# Patient Record
Sex: FEMALE | Race: BLACK OR AFRICAN AMERICAN | Employment: UNEMPLOYED | URBAN - METROPOLITAN AREA
[De-identification: names, ages, dates, MRNs, and addresses within clinical notes are randomized per-mention and may not be internally consistent; named-entity substitution may affect disease eponyms.]

---
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
## 2020-03-17 ENCOUNTER — TELEPHONE (OUTPATIENT)
Dept: FAMILY MEDICINE | Facility: CLINIC | Age: 20
End: 2020-03-17

## 2020-03-17 NOTE — TELEPHONE ENCOUNTER
Called patient.  She stated that she has had UTI's in the past and thinks that she has another one.  She is having urinary urgency, frequency, and burning.  She also stated that she has some med lower abdominal pain at night 4/10.  She denies any fevers, chills, flank  Pain, blood in her urine, vaginal discharge or odor.  Symptoms have been for one week.  Routed to provider to please advise.  Claire Stubbs RN,BSN  [facility]

## 2020-03-17 NOTE — TELEPHONE ENCOUNTER
Attempt # 1  Called patient at home number. [phone]  Was call answered?  No answer, left message to call nurse line at [phone]          Bethany Bee RN  [facility]

## 2020-03-17 NOTE — TELEPHONE ENCOUNTER
Reason for Call:  Other     Detailed comments: Patient has symptoms of a UTI and would like to discuss if an appointment needs to be scheduled due to she is unclear if this is a UTI.     Phone Number Patient can be reached at: Cell number on file:    Telephone Information:   Mobile [phone]       Best Time:     Can we leave a detailed message on this number? YES    Call taken on 3/17/2020 at 9:25 AM by Yolis Sutton

## 2020-03-17 NOTE — TELEPHONE ENCOUNTER
Appointment made.    Patient stated understanding and agreeable with the plan of care. Claire Stubbs,RN,BSN, [facility] Triage.

## 2020-03-18 ENCOUNTER — VIRTUAL VISIT (OUTPATIENT)
Dept: FAMILY MEDICINE | Facility: CLINIC | Age: 20
End: 2020-03-18
Payer: COMMERCIAL

## 2020-03-18 DIAGNOSIS — N39.0 URINARY TRACT INFECTION WITHOUT HEMATURIA, SITE UNSPECIFIED: Primary | ICD-10-CM

## 2020-03-18 PROCEDURE — 98966 PH1 ASSMT&MGMT NQHP 5-10: CPT | Performed by: PHYSICIAN ASSISTANT

## 2020-03-18 RX ORDER — SULFAMETHOXAZOLE/TRIMETHOPRIM 800-160 MG
1 TABLET ORAL 2 TIMES DAILY
Qty: 6 TABLET | Refills: 0 | Status: SHIPPED | OUTPATIENT
Start: 2020-03-18 | End: 2020-09-02

## 2020-03-18 NOTE — PROGRESS NOTES
"Kiko Cabrales is a 19 year old female who is being evaluated via a billable telephone visit.      The patient has been notified of following:     \"This telephone visit will be conducted via a call between you and your physician/provider. We have found that certain health care needs can be provided without the need for a physical exam.  This service lets us provide the care you need with a short phone conversation.  If a prescription is necessary we can send it directly to your pharmacy.  If lab work is needed we can place an order for that and you can then stop by our lab to have the test done at a later time.    If during the course of the call the physician/provider feels a telephone visit is not appropriate, you will not be charged for this service.\"       Kiko Cabrales complains of    Chief Complaint   Patient presents with     UTI       I have reviewed and updated the patient's Past Medical History, Social History, Family History and Medication List.    ALLERGIES  Patient has no known allergies.    MRamczyk  (MA signature)    Additional provider notes: UTI - Female LMP 3-4-2020  Duration of complaint: 1 week    Had one 2 weeks ago and that was the first one.  Was given medications and felt better after it.  A week after stopping antibiotics symptoms returned  Description:   Painful urination (Dysuria): YES           Frequency: YES  Blood in urine (Hematuria): no   Delay in urine (Hesitency): no   Intensity: 8/10  Progression of Symptoms:  improving  Accompanying Signs & Symptoms:  Fever/chills: no   Flank pain YES- sometimes   Nausea and vomiting: no   Any vaginal symptoms: vaginal discharge-brown -1 week ago   Abdominal/Pelvic Pain: YES  History:   History of frequent UTI's: no  History of kidney stones: no   Sexually Active: no   Possibility of pregnancy: No  Precipitating factors:   Therapies Tried and outcome: ibuprofen   Period lasted a little longer.  Not SA.  "                 Assessment/Plan:  (N39.0) Urinary tract infection without hematuria, site unspecified  (primary encounter diagnosis)  Comment:   Plan: sulfamethoxazole-trimethoprim (BACTRIM DS)         800-160 MG tablet        Will try a different antibiotic.  If symptom don't improve or return again shortly will need to see pt in clinic.        I have reviewed the note as documented above.  This accurately captures the substance of my conversation with the patient.  Nyasia Blunt PA-C     Phone call contact time  Call Started at 9:20  Call Ended at 9:25    Nyasia Blunt PA-C

## 2020-09-02 ENCOUNTER — OFFICE VISIT (OUTPATIENT)
Dept: FAMILY MEDICINE | Facility: CLINIC | Age: 20
End: 2020-09-02
Payer: COMMERCIAL

## 2020-09-02 VITALS
OXYGEN SATURATION: 99 % | HEIGHT: 64 IN | SYSTOLIC BLOOD PRESSURE: 114 MMHG | DIASTOLIC BLOOD PRESSURE: 80 MMHG | WEIGHT: 134 LBS | HEART RATE: 95 BPM | BODY MASS INDEX: 22.88 KG/M2

## 2020-09-02 DIAGNOSIS — Z00.00 ROUTINE GENERAL MEDICAL EXAMINATION AT A HEALTH CARE FACILITY: Primary | ICD-10-CM

## 2020-09-02 PROCEDURE — 99395 PREV VISIT EST AGE 18-39: CPT | Mod: 25 | Performed by: INTERNAL MEDICINE

## 2020-09-02 PROCEDURE — 90471 IMMUNIZATION ADMIN: CPT | Performed by: INTERNAL MEDICINE

## 2020-09-02 PROCEDURE — 90686 IIV4 VACC NO PRSV 0.5 ML IM: CPT | Performed by: INTERNAL MEDICINE

## 2020-09-02 RX ORDER — IBUPROFEN 600 MG/1
600 TABLET, FILM COATED ORAL EVERY 6 HOURS PRN
Qty: 100 TABLET | Refills: 3 | Status: SHIPPED | OUTPATIENT
Start: 2020-09-02 | End: 2022-12-05

## 2020-09-02 ASSESSMENT — MIFFLIN-ST. JEOR: SCORE: 1354.88

## 2020-09-02 NOTE — PROGRESS NOTES
SUBJECTIVE:   CC: Kiko Cabrales is an 20 year old woman who presents for preventive health visit.       Sophomore at [employer].  [employer]  Things went well.  Dorm food and diet.        Healthy Habits:     Getting at least 3 servings of Calcium per day:  NO    Bi-annual eye exam:  NO    Dental care twice a year:  Yes    Sleep apnea or symptoms of sleep apnea:  None    Diet:  Regular (no restrictions)    Frequency of exercise:  2-3 days/week    Duration of exercise:  30-45 minutes    Taking medications regularly:  Not Applicable    Barriers to taking medications:  Not applicable    Medication side effects:  Not applicable    PHQ-2 Total Score: 0    Additional concerns today:  No          Today's PHQ-2 Score:   PHQ-2 ( 1999 Pfizer) 9/2/2020   Q1: Little interest or pleasure in doing things 0   Q2: Feeling down, depressed or hopeless 0   PHQ-2 Score 0   Q1: Little interest or pleasure in doing things -   Q2: Feeling down, depressed or hopeless -   PHQ-2 Score -       Abuse: Current or Past (Physical, Sexual or Emotional) - No  Do you feel safe in your environment? Yes      Bites on the body from time to time        Social History     Tobacco Use     Smoking status: Never Smoker     Smokeless tobacco: Never Used   Substance Use Topics     Alcohol use: No     If you drink alcohol do you typically have >3 drinks per day or >7 drinks per week? No    No flowsheet data found.    Reviewed orders with patient.  Reviewed health maintenance and updated orders accordingly - Yes  Lab work is in process  Labs reviewed in EPIC  BP Readings from Last 3 Encounters:   09/02/20 114/80   08/22/19 129/86   04/22/19 108/68    Wt Readings from Last 3 Encounters:   09/02/20 60.8 kg (134 lb)   08/22/19 58.1 kg (128 lb) (53 %, Z= 0.07)*   04/22/19 65.8 kg (145 lb) (78 %, Z= 0.78)*     * Growth percentiles are based on CDC (Girls, 2-20 Years) data.                  Patient Active Problem List   Diagnosis     Seasonal allergies     " History reviewed. No pertinent surgical history.    Social History     Tobacco Use     Smoking status: Never Smoker     Smokeless tobacco: Never Used   Substance Use Topics     Alcohol use: No     Family History   Problem Relation Age of Onset     Diabetes Mother      Diabetes Paternal Grandmother      Diabetes Paternal Grandfather          Current Outpatient Medications   Medication Sig Dispense Refill     ibuprofen (ADVIL/MOTRIN) 600 MG tablet Take 1 tablet (600 mg) by mouth every 6 hours as needed for moderate pain 100 tablet 3     No Known Allergies  No lab results found.     Mammogram not appropriate for this patient based on age.    Pertinent mammograms are reviewed under the imaging tab.  History of abnormal Pap smear: NO - under age 21, PAP not appropriate for age     Reviewed and updated as needed this visit by clinical staff  Tobacco  Allergies  Med Hx  Surg Hx  Fam Hx  Soc Hx        Reviewed and updated as needed this visit by Provider            Review of Systems  CONSTITUTIONAL: NEGATIVE for fever, chills, change in weight  INTEGUMENTARU/SKIN: NEGATIVE for worrisome rashes, moles or lesions  EYES: NEGATIVE for vision changes or irritation  ENT: NEGATIVE for ear, mouth and throat problems  RESP: NEGATIVE for significant cough or SOB  BREAST: NEGATIVE for masses, tenderness or discharge  CV: NEGATIVE for chest pain, palpitations or peripheral edema  GI: NEGATIVE for nausea, abdominal pain, heartburn, or change in bowel habits  : NEGATIVE for unusual urinary or vaginal symptoms. Periods are regular.  MUSCULOSKELETAL: NEGATIVE for significant arthralgias or myalgia  NEURO: NEGATIVE for weakness, dizziness or paresthesias  PSYCHIATRIC: NEGATIVE for changes in mood or affect     OBJECTIVE:   /80 (BP Location: Right arm, Patient Position: Chair, Cuff Size: Adult Regular)   Pulse 95   Ht 1.613 m (5' 3.5\")   Wt 60.8 kg (134 lb)   LMP 08/28/2020   SpO2 99%   BMI 23.36 kg/m    Physical " "Exam  GENERAL: healthy, alert and no distress  EYES: Eyes grossly normal to inspection, PERRL and conjunctivae and sclerae normal  HENT: ear canals and TM's normal, nose and mouth without ulcers or lesions  NECK: no adenopathy, no asymmetry, masses, or scars and thyroid normal to palpation  RESP: lungs clear to auscultation - no rales, rhonchi or wheezes  CV: regular rate and rhythm, normal S1 S2, no S3 or S4, no murmur, click or rub, no peripheral edema and peripheral pulses strong  ABDOMEN: soft, nontender, no hepatosplenomegaly, no masses and bowel sounds normal  MS: no gross musculoskeletal defects noted, no edema  SKIN: no suspicious lesions or rashes  NEURO: Normal strength and tone, mentation intact and speech normal  BACK: no CVA tenderness, no paralumbar tenderness  PSYCH: mentation appears normal, affect normal/bright  LYMPH: no cervical, supraclavicular, axillary, or inguinal adenopathy    Diagnostic Test Results:  Labs reviewed in Epic  No results found for any visits on 09/02/20.    ASSESSMENT/PLAN:       ICD-10-CM    1. Routine general medical examination at a health care facility  Z00.00 ibuprofen (ADVIL/MOTRIN) 600 MG tablet       COUNSELING:  Reviewed preventive health counseling, as reflected in patient instructions       Regular exercise       Healthy diet/nutrition       Vision screening       Hearing screening       Contraception       Family planning       Safe sex practices/STD prevention    Estimated body mass index is 23.36 kg/m  as calculated from the following:    Height as of this encounter: 1.613 m (5' 3.5\").    Weight as of this encounter: 60.8 kg (134 lb).        She reports that she has never smoked. She has never used smokeless tobacco.      Counseling Resources:  ATP IV Guidelines  Pooled Cohorts Equation Calculator  Breast Cancer Risk Calculator  BRCA-Related Cancer Risk Assessment: FHS-7 Tool  FRAX Risk Assessment  ICSI Preventive Guidelines  Dietary Guidelines for Americans, " 2010  USDA's MyPlate  ASA Prophylaxis  Lung CA Screening    Alex Avila MD  [facility]

## 2022-02-04 ENCOUNTER — TELEPHONE (OUTPATIENT)
Dept: FAMILY MEDICINE | Facility: CLINIC | Age: 22
End: 2022-02-04

## 2022-02-04 ENCOUNTER — OFFICE VISIT (OUTPATIENT)
Dept: FAMILY MEDICINE | Facility: CLINIC | Age: 22
End: 2022-02-04
Payer: COMMERCIAL

## 2022-02-04 ENCOUNTER — LAB (OUTPATIENT)
Dept: LAB | Facility: CLINIC | Age: 22
End: 2022-02-04

## 2022-02-04 VITALS
DIASTOLIC BLOOD PRESSURE: 87 MMHG | WEIGHT: 160.7 LBS | OXYGEN SATURATION: 97 % | BODY MASS INDEX: 27.43 KG/M2 | TEMPERATURE: 98.2 F | HEIGHT: 64 IN | SYSTOLIC BLOOD PRESSURE: 126 MMHG | HEART RATE: 94 BPM

## 2022-02-04 DIAGNOSIS — R30.0 DYSURIA: Primary | ICD-10-CM

## 2022-02-04 DIAGNOSIS — N30.01 ACUTE CYSTITIS WITH HEMATURIA: ICD-10-CM

## 2022-02-04 DIAGNOSIS — R30.0 DYSURIA: ICD-10-CM

## 2022-02-04 DIAGNOSIS — R10.9 RIGHT FLANK PAIN: ICD-10-CM

## 2022-02-04 LAB
ALBUMIN UR-MCNC: 30 MG/DL
ANION GAP SERPL CALCULATED.3IONS-SCNC: 7 MMOL/L (ref 3–14)
APPEARANCE UR: ABNORMAL
BILIRUB UR QL STRIP: NEGATIVE
BUN SERPL-MCNC: 8 MG/DL (ref 7–30)
CALCIUM SERPL-MCNC: 9.1 MG/DL (ref 8.5–10.1)
CHLORIDE BLD-SCNC: 106 MMOL/L (ref 94–109)
CO2 SERPL-SCNC: 24 MMOL/L (ref 20–32)
COLOR UR AUTO: YELLOW
CREAT SERPL-MCNC: 0.6 MG/DL (ref 0.52–1.04)
GFR SERPL CREATININE-BSD FRML MDRD: >90 ML/MIN/1.73M2
GLUCOSE BLD-MCNC: 90 MG/DL (ref 70–99)
GLUCOSE UR STRIP-MCNC: NEGATIVE MG/DL
HGB UR QL STRIP: ABNORMAL
KETONES UR STRIP-MCNC: NEGATIVE MG/DL
LEUKOCYTE ESTERASE UR QL STRIP: ABNORMAL
MUCOUS THREADS #/AREA URNS LPF: PRESENT /LPF
NITRATE UR QL: NEGATIVE
PH UR STRIP: 7 [PH] (ref 5–7)
POTASSIUM BLD-SCNC: 3.8 MMOL/L (ref 3.4–5.3)
RBC URINE: 17 /HPF
SODIUM SERPL-SCNC: 137 MMOL/L (ref 133–144)
SP GR UR STRIP: 1.01 (ref 1–1.03)
SQUAMOUS EPITHELIAL: 1 /HPF
TRANSITIONAL EPI: <1 /HPF
UROBILINOGEN UR STRIP-MCNC: NORMAL MG/DL
WBC URINE: 24 /HPF

## 2022-02-04 PROCEDURE — 99000 SPECIMEN HANDLING OFFICE-LAB: CPT | Performed by: PATHOLOGY

## 2022-02-04 PROCEDURE — 87088 URINE BACTERIA CULTURE: CPT | Mod: 90 | Performed by: PATHOLOGY

## 2022-02-04 PROCEDURE — 87086 URINE CULTURE/COLONY COUNT: CPT | Mod: 90 | Performed by: PATHOLOGY

## 2022-02-04 PROCEDURE — 80048 BASIC METABOLIC PNL TOTAL CA: CPT | Performed by: PATHOLOGY

## 2022-02-04 PROCEDURE — 81001 URINALYSIS AUTO W/SCOPE: CPT | Performed by: PATHOLOGY

## 2022-02-04 PROCEDURE — 99214 OFFICE O/P EST MOD 30 MIN: CPT | Performed by: NURSE PRACTITIONER

## 2022-02-04 PROCEDURE — 36415 COLL VENOUS BLD VENIPUNCTURE: CPT | Performed by: PATHOLOGY

## 2022-02-04 RX ORDER — SULFAMETHOXAZOLE/TRIMETHOPRIM 800-160 MG
1 TABLET ORAL 2 TIMES DAILY
Qty: 6 TABLET | Refills: 0 | Status: SHIPPED | OUTPATIENT
Start: 2022-02-04 | End: 2022-02-07

## 2022-02-04 ASSESSMENT — MIFFLIN-ST. JEOR: SCORE: 1470.99

## 2022-02-04 NOTE — NURSING NOTE
Kiko Cabrales is a 21 year old female patient that presents today in clinic for the following:    No chief complaint on file.    The patient's allergies and medications were reviewed as noted. A set of vitals were recorded as noted without incident. The patient does not have any other questions for the provider.    Manjinder Tate, EMT at 11:30 AM on 2/4/2022

## 2022-02-04 NOTE — TELEPHONE ENCOUNTER
Called and gave results to patient that her UA was positive for infection and the medication for Bactrim was sent to her pharmacy. She verbalized understanding.     IMER Hayden CNP

## 2022-02-04 NOTE — PATIENT INSTRUCTIONS
Patient Education     Dysuria  Dysuria is when you have pain during urination. It is often described as a burning feeling. Learn more about this problem and how it can be treated.     Painful urination (dysuria) is often caused by a problem in the urinary tract.   What causes dysuria?  Possible causes include:    Infection with a bacteria or virus such as a urinary tract infection (UTI) or a sexually transmitted infection (STI)    Sensitivity or allergy to chemicals such as those found in lotions and other products    Prostate or bladder problems    Radiation therapy to the pelvic area  How is dysuria diagnosed?  Your healthcare provider will examine you. He or she will ask about your symptoms and health. After talking with you and doing a physical exam, your healthcare provider may know what is causing your dysuria. You will often have to give a urine sample. Tests of your urine (urinalysis) are done. A urinalysis may include:    Looking at the urine sample (visual exam)    Checking for substances (chemical exam)    Looking at a small amount of the urine under a microscope (microscopic exam)  Some parts of the urinalysis may be done in the provider's office and some in a lab. The urine sample may also be checked for bacteria and yeast (urine culture). Your healthcare provider will tell you more about these tests if they are needed.  How is dysuria treated?  Treatment depends on the cause. If you have a bacterial infection, you may need antibiotics. You may be given medicines to make it easier for you to urinate and help ease pain. Your healthcare provider can tell you more about your treatment options. If not treated, symptoms may get worse.  When to call your healthcare provider  Call the healthcare provider right away if you have any of the following:    Fever of  100.4  F ( 38 C) or higher, or as directed by your provider    No improvement after 3 days of treatment    Trouble urinating because of pain    New or  increased discharge from the vagina or penis    Rash or joint pain    Increased back or belly pain    Enlarged painful lymph nodes (lumps) in the groin  [employer] last reviewed this educational content on 4/1/2019 2000-2021 [employer]. All rights reserved. This information is not intended as a substitute for professional medical care. Always follow your healthcare professional's instructions.

## 2022-02-04 NOTE — PROGRESS NOTES
Today's Date: Feb 4, 2022     Patient Kiko Cabrales 2000 presents to the clinic today to address   Chief Complaint   Patient presents with     RECHECK     having lower dorsal pain             SUBJECTIVE     History of Present Illness:    Onset: Symptoms began about a month ago. Symptom onset was/is sudden. This is a recurrent condition.  Location: right  flank  Duration: 1         months  Characteristics: Sharp  Aggravating factors: at HS  Relieving factors: muscle crap cream for the right flank pain, Azo for the urinary symptoms  Radiation: no   Treatments tried: changing positions, medications  Severity: 10/10 at it's worst 4/10 currently   Potential causes per patient: UTI    Last month patient states that she had some UTI symptoms and back pain and she started taking Azo for the pain, but did not get treated with antibiotics. It has been a year since patient was treated with antibiotics for UTI. Patient says that she finished her period 2 days ago.     Review of Systems  As above, nausea and vomitting yesterday. States not pregnant because she just finished her period. Not concerned with STI's with current partner.     No Known Allergies     Current Outpatient Medications   Medication Instructions     ibuprofen (ADVIL/MOTRIN) 600 mg, Oral, EVERY 6 HOURS PRN       Past Medical History:   Diagnosis Date     Forearm fracture 2004    LT     Seasonal allergies         Family History   Problem Relation Age of Onset     Diabetes Mother      Diabetes Paternal Grandmother      Diabetes Paternal Grandfather         Social History     Socioeconomic History     Marital status: Single     Spouse name: Not on file     Number of children: Not on file     Years of education: Not on file     Highest education level: Not on file   Occupational History     Not on file   Tobacco Use     Smoking status: Never Smoker     Smokeless tobacco: Never Used   Substance and Sexual Activity     Alcohol use: No     Drug use: No      "Sexual activity: Never   Other Topics Concern     Not on file   Social History Narrative     Not on file     Social Determinants of Health     Financial Resource Strain: Not on file   Food Insecurity: Not on file   Transportation Needs: Not on file   Physical Activity: Not on file   Stress: Not on file   Social Connections: Not on file   Intimate Partner Violence: Not on file   Housing Stability: Not on file        No flowsheet data found.     Immunization History   Administered Date(s) Administered     COVID-19,PF,Pfizer (12+ Yrs) 05/10/2021, 06/03/2021     Comvax (HIB/HepB) 2000, 2000, 07/16/2001     DTAP (<7y) 2000, 2000, 01/17/2001, 11/02/2001, 08/22/2005     HEPA 03/21/2008, 09/03/2010     HPV 07/30/2012, 07/31/2013, 08/05/2014     Influenza (H1N1) 12/15/2009     Influenza (IIV3) PF 11/13/2007, 11/03/2009, 11/24/2010, 09/09/2011, 11/07/2012     Influenza Vaccine IM > 6 months Valent IIV4 (Alfuria,Fluzone) 10/26/2013, 10/23/2014, 10/19/2015, 09/02/2020     MMR 07/16/2001, 08/22/2005     Meningococcal (Bexsero ) 08/10/2018, 08/22/2019     Meningococcal (Menactra ) 07/31/2013, 08/10/2018     Meningococcal (Menomune ) 03/21/2008     Pneumococcal (PCV 7) 2000, 2000, 01/17/2001, 11/02/2001     Poliovirus, inactivated (IPV) 2000, 2000, 01/17/2001, 08/22/2005     TDAP Vaccine (Boostrix) 07/30/2012     Typhoid IM 03/21/2008     Varicella 07/16/2001, 12/31/2005     Yellow Fever 03/21/2008                 OBJECTIVE     /87 (BP Location: Right arm, Patient Position: Sitting, Cuff Size: Adult Regular)   Pulse 94   Temp 98.2  F (36.8  C) (Oral)   Ht 1.613 m (5' 3.5\")   Wt 72.9 kg (160 lb 11.2 oz)   SpO2 97%   BMI 28.02 kg/m       Labs:  No results found for: WBC, HGB, HCT, PLT, CHOL, TRIG, HDL, LDLDIRECT, ALT, AST, NA, CREATININE, BUN, CO2, TSH, PSA, INR, GLUF, HGBA1C, MICROALBUR       Physical Exam  HENT:      Head: Normocephalic.   Cardiovascular:      Rate and " Rhythm: Normal rate and regular rhythm.      Pulses: Normal pulses.      Heart sounds: Normal heart sounds.   Pulmonary:      Effort: Pulmonary effort is normal.      Breath sounds: Normal breath sounds.   Abdominal:      General: Abdomen is flat. Bowel sounds are normal.      Palpations: Abdomen is soft.   Musculoskeletal:         General: Normal range of motion.   Skin:     General: Skin is warm and dry.   Neurological:      Mental Status: She is alert and oriented to person, place, and time.   Psychiatric:         Mood and Affect: Mood normal.               ASSESSMENT/PLAN     (R30.0) Dysuria  (primary encounter diagnosis)  Comment:   Plan: UA with Micro reflex to Culture, BMP. Discussed           UA positive- Start Bactrim DS 1 tab twice daily for 3 days.      Called patient with results, does not have myChart.     Follow up pending results, or if symptoms worsen or fail to improve.   AVS printed and given to patient.    IMER Hayden [facility]  [facility]  [address] [address]  [phone]

## 2022-02-06 LAB
BACTERIA UR CULT: ABNORMAL
BACTERIA UR CULT: ABNORMAL

## 2022-04-08 ENCOUNTER — OFFICE VISIT (OUTPATIENT)
Dept: FAMILY MEDICINE | Facility: CLINIC | Age: 22
End: 2022-04-08
Payer: COMMERCIAL

## 2022-04-08 ENCOUNTER — LAB (OUTPATIENT)
Dept: LAB | Facility: CLINIC | Age: 22
End: 2022-04-08
Payer: COMMERCIAL

## 2022-04-08 DIAGNOSIS — R30.0 DYSURIA: Primary | ICD-10-CM

## 2022-04-08 DIAGNOSIS — R30.0 DYSURIA: ICD-10-CM

## 2022-04-08 LAB
ALBUMIN UR-MCNC: NEGATIVE MG/DL
APPEARANCE UR: CLEAR
BILIRUB UR QL STRIP: NEGATIVE
COLOR UR AUTO: YELLOW
GLUCOSE UR STRIP-MCNC: NEGATIVE MG/DL
HGB UR QL STRIP: NEGATIVE
KETONES UR STRIP-MCNC: NEGATIVE MG/DL
LEUKOCYTE ESTERASE UR QL STRIP: NEGATIVE
NITRATE UR QL: NEGATIVE
PH UR STRIP: 8 [PH] (ref 5–7)
RBC URINE: 1 /HPF
SP GR UR STRIP: 1.01 (ref 1–1.03)
SQUAMOUS EPITHELIAL: 1 /HPF
UROBILINOGEN UR STRIP-MCNC: NORMAL MG/DL
WBC URINE: 6 /HPF

## 2022-04-08 PROCEDURE — 87086 URINE CULTURE/COLONY COUNT: CPT | Mod: 90 | Performed by: PATHOLOGY

## 2022-04-08 PROCEDURE — 99000 SPECIMEN HANDLING OFFICE-LAB: CPT | Performed by: PATHOLOGY

## 2022-04-08 PROCEDURE — 99213 OFFICE O/P EST LOW 20 MIN: CPT | Performed by: NURSE PRACTITIONER

## 2022-04-08 PROCEDURE — 81001 URINALYSIS AUTO W/SCOPE: CPT | Performed by: PATHOLOGY

## 2022-04-08 RX ORDER — CIPROFLOXACIN 500 MG/1
500 TABLET, FILM COATED ORAL 2 TIMES DAILY
Qty: 10 TABLET | Refills: 0 | Status: SHIPPED | OUTPATIENT
Start: 2022-04-08 | End: 2022-06-28

## 2022-04-08 ASSESSMENT — ENCOUNTER SYMPTOMS
HEMATURIA: 0
NAUSEA: 0
DIFFICULTY URINATING: 1
VOMITING: 0
FREQUENCY: 1
CHILLS: 0
FLANK PAIN: 1
DYSURIA: 1
SHORTNESS OF BREATH: 0
FATIGUE: 0
FEVER: 0
COUGH: 0
PALPITATIONS: 0

## 2022-04-08 NOTE — PATIENT INSTRUCTIONS
Patient Education     Cipro Oral Tablet 500 mg  Uses  For treating bacterial infection.  Instructions  Swallow the medicine without crushing or chewing it.  This medicine may be taken with or without food.  Swallow with a full glass (8 oz) of water unless your doctor gives you different instructions.  Keep the medicine at room temperature. Avoid heat and direct light.  Drink extra water while on this medicine. Adults should try to drink 6-8 cups (48 to 64 oz.) of water every day.  Avoid drinks with caffeine while on this medicine.  Do not take this medicine with milk or milk products (such as yogurt).  Do not take this medicine with antacids.  Do not take any antacids or vitamins with magnesium, calcium, aluminum, iron, or zinc for 6 hours before or 2 hours after taking this medicine.  This medicine may cause you to become more sensitive to the sun. Use sunscreen or wear protective clothing when you are exposed to the sun.  It is important that you keep taking each dose of this medicine on time even if you are feeling well.  If you forget to take a dose on time, take it as soon as you remember. If it is less than 6 hours to the next dose, do not take the missed dose. Return to your normal dosing schedule. Do not take 2 doses of this medicine at one time.  Please tell your doctor and pharmacist about all the medicines you take. Include both prescription and over-the-counter medicines. Also tell them about any vitamins, herbal medicines, or anything else you take for your health.  Cautions  Tell your doctor and pharmacist if you ever had an allergic reaction to a medicine. Symptoms of an allergic reaction can include trouble breathing, skin rash, itching, swelling, or severe dizziness.  Some patients taking this medicine have experienced serious side effects. Please speak with your doctor to understand the risks and benefits associated with this medicine.  Few patients may experience tendon problems. This is more  common in patients older than 60. If you notice pain, swelling, or difficulty moving your joints, tell your doctor. This can happen even after stopping the medicine for a few months.  This medicine is associated with a rare, but serious problem of the liver. Speak to your doctor about the early signs of liver problems and the benefits and risks of using this medicine.  Do not use the medication any more than instructed.  Your ability to stay alert or to react quickly may be impaired by this medicine. Do not drive or operate machinery until you know how this medicine will affect you.  Please check with your doctor before drinking alcohol while on this medicine.  Contact your doctor if you notice a change in the amount or darkening of your urine.  Speak with your health care provider before receiving any vaccinations.  Please tell your doctor if you have moderate to severe diarrhea while on this medicine. Do not treat the diarrhea with over-the-counter diarrhea medicine.  Tell the doctor or pharmacist if you are pregnant, planning to be pregnant, or breastfeeding.  Ask your pharmacist if this medicine can interact with any of your other medicines. Be sure to tell them about all the medicines you take.  Please tell all your doctors and dentists that you are on this medicine before they provide care.  Do not start or stop any other medicines without first speaking to your doctor or pharmacist.  Do not share this medicine with anyone who has not been prescribed this medicine.  This medicine can cause serious side effects in some patients. Important information from the [place] Food and Drug Administration (FDA) is available from your pharmacist. Please review it carefully with your pharmacist to understand the risks associated with this medicine.  Side Effects  The following is a list of some common side effects from this medicine. Please speak with your doctor about what you should do if you experience these or other side  effects.    agitated feeling or trouble sleeping    diarrhea    dizziness    nausea    stomach upset or abdominal pain    increased risk of sunburn    yeast infection of mouth    vaginal itching or yeast infection    vomiting  Call your doctor or get medical help right away if you notice any of these more serious side effects:    bleeding that is severe or takes longer to stop    unusual bruising or discoloration on skin    chest pain    coughing    severe, watery or bloody diarrhea    fainting    severe or persistent headache    fast or irregular heart beats    pain in the joints    symptoms of liver damage (such as yellowing of skin or eyes, dark urine, unusual tiredness or weakness; severe stomach or back pain)    mood changes    muscle trembling    muscle weakness    feeling of numbness or tingling in your hands and feet    seizures    shortness of breath    red, peeling or blistering skin    severe stomach or bowel pain    light colored stool    urinating less often    difficulty or discomfort urinating    blood in urine    severe or persistent vomiting  A few people may have an allergic reactions to this medicine. Symptoms can include difficulty breathing, skin rash, itching, swelling, or severe dizziness. If you notice any of these symptoms, seek medical help quickly.  Extra  Please speak with your doctor, nurse, or pharmacist if you have any questions about this medicine.  [email or web address]  IMPORTANT NOTE: This document tells you briefly how to take your medicine, but it does not tell you all there is to know about it.Your doctor or pharmacist may give you other documents about your medicine. Please talk to them if you have any questions.Always follow their advice. There is a more complete description of this medicine available in English.Scan this code on your smartphone or tablet or use the web address below. You can also ask your pharmacist for a printout. If you have any  questions, please ask your pharmacist.     2021 [employer].

## 2022-04-08 NOTE — PROGRESS NOTES
Today's Date: Apr 8, 2022     Patient Kiko Cabrales 2000 presents to the clinic today to address No chief complaint on file.            SUBJECTIVE     History of Present Illness: 21 year old female presents to discuss UTI symptoms. Symptoms started approximately 2 days ago and include urinary frequency, pain, difficulty urinating, and left sided flank pain. Patient denies systemic symptoms including fevers, chills, fatigue. Denies recent sexual intercourse. Reports that she recently started her menstrual cycle. Patient had a recent UTI in 2/2022 that resolved with bactrim. No other acute concerns/symptoms at time of exam.      Review of Systems   Constitutional: Negative for chills, fatigue and fever.   Respiratory: Negative for cough and shortness of breath.    Cardiovascular: Negative for chest pain and palpitations.   Gastrointestinal: Negative for nausea and vomiting.   Genitourinary: Positive for difficulty urinating, dysuria, flank pain (Left side) and frequency. Negative for hematuria.   Constitutional, HEENT, cardiovascular, pulmonary, gi and gu systems are negative, except as otherwise noted.      No Known Allergies     Current Outpatient Medications   Medication Instructions     ibuprofen (ADVIL/MOTRIN) 600 mg, Oral, EVERY 6 HOURS PRN       Past Medical History:   Diagnosis Date     Forearm fracture 2004    LT     Seasonal allergies         Family History   Problem Relation Age of Onset     Diabetes Mother      Diabetes Paternal Grandmother      Diabetes Paternal Grandfather         Social History     Tobacco Use     Smoking status: Never Smoker     Smokeless tobacco: Never Used   Vaping Use     Vaping Use: Never used   Substance Use Topics     Alcohol use: No     Drug use: No        History   Sexual Activity     Sexual activity: Never        No flowsheet data found.     Immunization History   Administered Date(s) Administered     Comvax (HIB/HepB) 2000, 2000, 07/16/2001     DTAP (<7y)  2000, 2000, 01/17/2001, 11/02/2001, 08/22/2005     HEPA 03/21/2008, 09/03/2010     HPV 07/30/2012, 07/31/2013, 08/05/2014     Influenza (H1N1) 12/15/2009     Influenza (IIV3) PF 11/13/2007, 11/03/2009, 11/24/2010, 09/09/2011, 11/07/2012     Influenza Vaccine IM > 6 months Valent IIV4 (Alfuria,Fluzone) 10/26/2013, 10/23/2014, 10/19/2015, 09/02/2020     MMR 07/16/2001, 08/22/2005     Meningococcal (Bexsero ) 08/10/2018, 08/22/2019     Meningococcal (Menactra ) 07/31/2013, 08/10/2018     Meningococcal (Menomune ) 03/21/2008     Pneumococcal (PCV 7) 2000, 2000, 01/17/2001, 11/02/2001     Poliovirus, inactivated (IPV) 2000, 2000, 01/17/2001, 08/22/2005     TDAP Vaccine (Boostrix) 07/30/2012     Typhoid IM 03/21/2008     Varicella 07/16/2001, 12/31/2005     Yellow Fever 03/21/2008            OBJECTIVE     There were no vitals taken for this visit.      Labs:  Lab Results   Component Value Date     02/04/2022    BUN 8 02/04/2022    CO2 24 02/04/2022        Physical Exam  Constitutional:       General: She is not in acute distress.     Appearance: She is not ill-appearing or toxic-appearing.   Cardiovascular:      Rate and Rhythm: Normal rate and regular rhythm.      Heart sounds: Normal heart sounds.   Pulmonary:      Effort: Pulmonary effort is normal.      Breath sounds: No wheezing, rhonchi or rales.   Abdominal:      Tenderness: There is no right CVA tenderness or left CVA tenderness.   Neurological:      Mental Status: She is alert.   Psychiatric:         Thought Content: Thought content normal.         Judgment: Judgment normal.               ASSESSMENT/PLAN   1. Dysuria  In the setting of recent UTI treatment with Bactrim, there is a concern for a MDR organism. Patient endorses current L flank pain though no appreciable CVA tenderness noted. Will start of cipro for 5 days and check UA/UC. Advised patient on s/e including DTR and instructed to not engage in high-intensity  exercise over next few weeks. Patient stated understanding/agreement to Rx/instructions.  - UA with Micro reflex to Culture; Future  - ciprofloxacin (CIPRO) 500 MG tablet; Take 1 tablet (500 mg) by mouth 2 times daily  Dispense: 10 tablet; Refill: 0  - Urine Culture; Future      Education provided on at-home supportive measures including hydration.    Follow-Up:  - Follow up in as needed, or if symptoms worsen or fail to improve.     Options for treatment and follow-up care were reviewed with the patient. Patient engaged in the decision making process and verbalized understanding of the options discussed and agreed with the final plan.  AVS printed and given to patient.    IMER Jacob [facility]  [facility]  [address] [address] [phone]

## 2022-04-10 LAB — BACTERIA UR CULT: NORMAL

## 2022-04-11 ENCOUNTER — TELEPHONE (OUTPATIENT)
Dept: FAMILY MEDICINE | Facility: CLINIC | Age: 22
End: 2022-04-11
Payer: COMMERCIAL

## 2022-04-11 NOTE — TELEPHONE ENCOUNTER
952am- Attempted to call to discuss symptoms and UA/UC results.    11:49am Telephone conversation (4:44minutes)- Discussed UA/UC results. UC with urogenital guido, no bacteria isolated. Patient reports that she initially felt bloated and fatigued on cipro, however, those symptoms as well as her UTI symptoms have resolved. Since she has completed 3 days of therapy- advised her that she can stop Cipro as typical Cipro course for uncomplicated UTI is 3 days and instructed pt to continue to ensure adequate hydration.    All questions/concerns addressed. Patient stated understanding/agreement to plan of care.  Return to clinic if symptoms return, prn.    IMER Jacob, CNP  [employer] [facility] [employer]

## 2022-06-28 ENCOUNTER — OFFICE VISIT (OUTPATIENT)
Dept: FAMILY MEDICINE | Facility: CLINIC | Age: 22
End: 2022-06-28
Payer: COMMERCIAL

## 2022-06-28 ENCOUNTER — LAB (OUTPATIENT)
Dept: LAB | Facility: CLINIC | Age: 22
End: 2022-06-28

## 2022-06-28 VITALS
HEIGHT: 64 IN | DIASTOLIC BLOOD PRESSURE: 84 MMHG | OXYGEN SATURATION: 99 % | HEART RATE: 97 BPM | WEIGHT: 175.9 LBS | TEMPERATURE: 98.3 F | SYSTOLIC BLOOD PRESSURE: 122 MMHG | BODY MASS INDEX: 30.03 KG/M2

## 2022-06-28 DIAGNOSIS — N89.8 VAGINAL DISCHARGE: ICD-10-CM

## 2022-06-28 DIAGNOSIS — K92.1 BLOOD IN STOOL: Primary | ICD-10-CM

## 2022-06-28 DIAGNOSIS — M41.9 SCOLIOSIS OF THORACIC SPINE, UNSPECIFIED SCOLIOSIS TYPE: ICD-10-CM

## 2022-06-28 DIAGNOSIS — K92.1 BLOOD IN STOOL: ICD-10-CM

## 2022-06-28 LAB
ALBUMIN UR-MCNC: NEGATIVE MG/DL
APPEARANCE UR: CLEAR
BILIRUB UR QL STRIP: NEGATIVE
CLUE CELLS: ABNORMAL
COLOR UR AUTO: YELLOW
ERYTHROCYTE [DISTWIDTH] IN BLOOD BY AUTOMATED COUNT: 13.5 % (ref 10–15)
GLUCOSE UR STRIP-MCNC: NEGATIVE MG/DL
HCT VFR BLD AUTO: 37 % (ref 35–47)
HGB BLD-MCNC: 11.7 G/DL (ref 11.7–15.7)
HGB UR QL STRIP: NEGATIVE
KETONES UR STRIP-MCNC: NEGATIVE MG/DL
LEUKOCYTE ESTERASE UR QL STRIP: ABNORMAL
MCH RBC QN AUTO: 27.1 PG (ref 26.5–33)
MCHC RBC AUTO-ENTMCNC: 31.6 G/DL (ref 31.5–36.5)
MCV RBC AUTO: 86 FL (ref 78–100)
NITRATE UR QL: NEGATIVE
PH UR STRIP: 8 [PH] (ref 5–7)
PLATELET # BLD AUTO: 281 10E3/UL (ref 150–450)
RBC # BLD AUTO: 4.31 10E6/UL (ref 3.8–5.2)
RBC URINE: 1 /HPF
SP GR UR STRIP: 1.02 (ref 1–1.03)
SQUAMOUS EPITHELIAL: 1 /HPF
TRICHOMONAS, WET PREP: ABNORMAL
UROBILINOGEN UR STRIP-MCNC: NORMAL MG/DL
WBC # BLD AUTO: 9.8 10E3/UL (ref 4–11)
WBC URINE: 4 /HPF
WBC'S/HIGH POWER FIELD, WET PREP: ABNORMAL
YEAST, WET PREP: PRESENT

## 2022-06-28 PROCEDURE — 36415 COLL VENOUS BLD VENIPUNCTURE: CPT | Performed by: PATHOLOGY

## 2022-06-28 PROCEDURE — 81001 URINALYSIS AUTO W/SCOPE: CPT | Performed by: PATHOLOGY

## 2022-06-28 PROCEDURE — 85027 COMPLETE CBC AUTOMATED: CPT | Performed by: PATHOLOGY

## 2022-06-28 PROCEDURE — 87210 SMEAR WET MOUNT SALINE/INK: CPT | Performed by: PATHOLOGY

## 2022-06-28 PROCEDURE — 99214 OFFICE O/P EST MOD 30 MIN: CPT | Performed by: NURSE PRACTITIONER

## 2022-06-28 RX ORDER — HYDROCORTISONE ACETATE 25 MG/1
25 SUPPOSITORY RECTAL 2 TIMES DAILY
Qty: 14 SUPPOSITORY | Refills: 0 | Status: SHIPPED | OUTPATIENT
Start: 2022-06-28 | End: 2022-12-05

## 2022-06-28 RX ORDER — IBUPROFEN 600 MG/1
600 TABLET, FILM COATED ORAL EVERY 6 HOURS PRN
Qty: 100 TABLET | Refills: 3 | Status: CANCELLED | OUTPATIENT
Start: 2022-06-28

## 2022-06-28 RX ORDER — FLUCONAZOLE 150 MG/1
TABLET ORAL
COMMUNITY
Start: 2021-11-18 | End: 2022-12-05

## 2022-06-28 RX ORDER — ASPIRIN 81 MG
100 TABLET, DELAYED RELEASE (ENTERIC COATED) ORAL DAILY
Qty: 30 TABLET | Refills: 1 | Status: SHIPPED | OUTPATIENT
Start: 2022-06-28 | End: 2022-12-05

## 2022-06-28 RX ORDER — FLUCONAZOLE 150 MG/1
150 TABLET ORAL ONCE
Qty: 1 TABLET | Refills: 0 | Status: SHIPPED | OUTPATIENT
Start: 2022-06-28 | End: 2022-06-28

## 2022-06-28 ASSESSMENT — ANXIETY QUESTIONNAIRES
4. TROUBLE RELAXING: NOT AT ALL
GAD7 TOTAL SCORE: 0
7. FEELING AFRAID AS IF SOMETHING AWFUL MIGHT HAPPEN: NOT AT ALL
2. NOT BEING ABLE TO STOP OR CONTROL WORRYING: NOT AT ALL
1. FEELING NERVOUS, ANXIOUS, OR ON EDGE: NOT AT ALL
5. BEING SO RESTLESS THAT IT IS HARD TO SIT STILL: NOT AT ALL
6. BECOMING EASILY ANNOYED OR IRRITABLE: NOT AT ALL
8. IF YOU CHECKED OFF ANY PROBLEMS, HOW DIFFICULT HAVE THESE MADE IT FOR YOU TO DO YOUR WORK, TAKE CARE OF THINGS AT HOME, OR GET ALONG WITH OTHER PEOPLE?: NOT DIFFICULT AT ALL
GAD7 TOTAL SCORE: 0
7. FEELING AFRAID AS IF SOMETHING AWFUL MIGHT HAPPEN: NOT AT ALL
3. WORRYING TOO MUCH ABOUT DIFFERENT THINGS: NOT AT ALL
GAD7 TOTAL SCORE: 0

## 2022-06-28 ASSESSMENT — PAIN SCALES - GENERAL: PAINLEVEL: SEVERE PAIN (6)

## 2022-06-28 NOTE — PROGRESS NOTES
"Today's Date: Jun 28, 2022     Patient Kiko Cabrales 2000 presents to the clinic today to address   Chief Complaint   Patient presents with     Physical     Pt reports symptoms of lower back pain/discomfort from several weeks ago             SUBJECTIVE     History of Present Illness:    21 year old female presents to discuss a few concerns.     Back pain- Patient endorses history of chronic back pain for \"as long as\" she can remember. Denies trauma to her back. She had imaging as a child which confirmed thoracic scoliosis. She denies back pain at time of exam, however, the pain can be as high as 8-9/10. Back will predominately be in the lumbar region. She denies decreased ROM, saddle anesthesia, bowel/bladder incontinence. She denies taking medications for back pain, she will lay in bed and/or take a hot shower when it increases.    Vaginal discharge- Patient noticed an increase in vaginal discharge over the past week. She thought it was from her menstrual cycle, however, the discharge did not stop and became white, thick, with cottage cheese consistency. Her current symptoms match those from her previous yeast infections in the past. She reports a normal pap smear earlier this year at [facility]. She currently denies fevers, does endorse some burning with voiding.    Constipation-  Patient reports chronic constipation. She will typically have 2-3 BMs/week. She reports that it typically takes 15-30 minutes for her to have a BM after she sits on the toilet. She endorses possible dehydration. Her BMs are sometimes normal/soft/brown, while other times they are pellet-like and difficult/painful to pass. She reports that over the past 2 weeks, she has noticed blood at the end of her BM, especially when the BM is painful to past.  She denies history of hemorrhoid. She endorses recent weight gain and stress, denies depressive symptoms. She is not sure if weight gain has any effect on constipation.  No " other acute concerns/symptoms at time of exam.       Review of Systems   Constitutional, HEENT, cardiovascular, pulmonary, gi and gu systems are negative, except as otherwise noted.       No Known Allergies     Current Outpatient Medications   Medication Instructions     fluconazole (DIFLUCAN) 150 MG tablet TAKE 1 TABLET BY MOUTH EVERY 3 DAYS     ibuprofen (ADVIL/MOTRIN) 600 mg, Oral, EVERY 6 HOURS PRN       Past Medical History:   Diagnosis Date     Forearm fracture 2004    LT     Seasonal allergies         Family History   Problem Relation Age of Onset     Diabetes Mother      Diabetes Paternal Grandmother      Diabetes Paternal Grandfather         Social History     Tobacco Use     Smoking status: Never Smoker     Smokeless tobacco: Never Used   Vaping Use     Vaping Use: Never used   Substance Use Topics     Alcohol use: No     Drug use: No        History   Sexual Activity     Sexual activity: Never        No flowsheet data found.     Immunization History   Administered Date(s) Administered     Comvax (HIB/HepB) 2000, 2000, 07/16/2001     DTAP (<7y) 2000, 2000, 01/17/2001, 11/02/2001, 08/22/2005     HEPA 03/21/2008, 09/03/2010     HPV 07/30/2012, 07/31/2013, 08/05/2014     Influenza (H1N1) 12/15/2009     Influenza (IIV3) PF 11/13/2007, 11/03/2009, 11/24/2010, 09/09/2011, 11/07/2012     Influenza Vaccine IM > 6 months Valent IIV4 (Alfuria,Fluzone) 10/26/2013, 10/23/2014, 10/19/2015, 09/02/2020     MMR 07/16/2001, 08/22/2005     Meningococcal (Bexsero ) 08/10/2018, 08/22/2019     Meningococcal (Menactra ) 07/31/2013, 08/10/2018     Meningococcal (Menomune ) 03/21/2008     Pneumococcal (PCV 7) 2000, 2000, 01/17/2001, 11/02/2001     Poliovirus, inactivated (IPV) 2000, 2000, 01/17/2001, 08/22/2005     TDAP Vaccine (Boostrix) 07/30/2012     Typhoid IM 03/21/2008     Varicella 07/16/2001, 12/31/2005     Yellow Fever 03/21/2008             OBJECTIVE     /84 (BP  "Location: Right arm, Patient Position: Sitting, Cuff Size: Adult Regular)   Pulse 97   Temp 98.3  F (36.8  C) (Oral)   Ht 1.626 m (5' 4\")   Wt 79.8 kg (175 lb 14.4 oz)   SpO2 99%   BMI 30.19 kg/m       Labs:  Lab Results   Component Value Date     02/04/2022    BUN 8 02/04/2022    CO2 24 02/04/2022        Physical Exam  Exam conducted with a chaperone present.   Constitutional:       General: She is not in acute distress.     Appearance: She is not ill-appearing.   Cardiovascular:      Rate and Rhythm: Normal rate and regular rhythm.      Heart sounds: Normal heart sounds. No murmur heard.    No friction rub. No gallop.   Pulmonary:      Effort: Pulmonary effort is normal. No respiratory distress.      Breath sounds: Normal breath sounds. No wheezing, rhonchi or rales.   Abdominal:      General: Abdomen is flat. Bowel sounds are normal.      Palpations: Abdomen is soft.      Tenderness: There is no abdominal tenderness.      Comments: On inspection of anus, external hemorrhoid noted in 6'olock position. No anal fissure noted.   Musculoskeletal:      Cervical back: Neck supple.      Thoracic back: No swelling, edema, tenderness or bony tenderness. Normal range of motion. Scoliosis present.      Lumbar back: No swelling, edema, tenderness or bony tenderness. Normal range of motion. Negative right straight leg raise test and negative left straight leg raise test.   Skin:     General: Skin is warm and dry.   Neurological:      General: No focal deficit present.      Mental Status: She is alert.   Psychiatric:         Thought Content: Thought content normal.         Judgment: Judgment normal.        Shaye Lucas CMA served as chaperone.         ASSESSMENT/PLAN     1. Blood in stool  In the setting of chronic constipation, pain and difficulty passing BMs, with external hemorrhoid on inspection, patient's blood in stool more likely than not from hemorrhoid.  Will check CBC for anemia. Start anal suppository " BID to reduce hemorrhoid inflammation for 1 week and daily stool softener. Advised patient to increase hydration with water and exercising. Do not sit on toilet for long periods of time.  - CBC with platelets; Future  - hydrocortisone (ANUSOL-HC) 25 MG suppository; Place 1 suppository (25 mg) rectally 2 times daily  Dispense: 14 suppository; Refill: 0  - docusate sodium (COLACE) 100 MG tablet; Take 1 tablet (100 mg) by mouth daily  Dispense: 30 tablet; Refill: 1    2. Vaginal discharge  Constellation of symptoms c/w vaginal candidiasis: Rx diflucan 1x/dose. Will check wet prep to r/o other forms of vaginosis and UA given history of UTIs. Wet prep collected with self swab.  - UA with Micro reflex to Culture; Future  - Wet prep  - fluconazole (DIFLUCAN) 150 MG tablet; Take 1 tablet (150 mg) by mouth once for 1 dose  Dispense: 1 tablet; Refill: 0    3. Scoliosis of thoracic spine, unspecified scoliosis type  Refer to PT for further eval/tx.  - Physical Therapy Referral; Future      Education provided on at-home supportive measures including limit screen time, especially when on toilet.     Follow-Up:  - Follow up in 4 week(s) for repeat CBC/symptom evaluation, or if symptoms worsen or fail to improve.     Options for treatment and follow-up care were reviewed with the patient. Patient engaged in the decision making process and verbalized understanding of the options discussed and agreed with the final plan.  AVS printed and given to patient.    IMER Jacob CNP    [facility]  [facility] [address] [id number] [phone]

## 2022-07-08 ENCOUNTER — THERAPY VISIT (OUTPATIENT)
Dept: PHYSICAL THERAPY | Facility: CLINIC | Age: 22
End: 2022-07-08
Attending: NURSE PRACTITIONER
Payer: COMMERCIAL

## 2022-07-08 DIAGNOSIS — M41.9 SCOLIOSIS OF THORACIC SPINE, UNSPECIFIED SCOLIOSIS TYPE: ICD-10-CM

## 2022-07-08 PROCEDURE — 97161 PT EVAL LOW COMPLEX 20 MIN: CPT | Mod: GP

## 2022-07-08 PROCEDURE — 97110 THERAPEUTIC EXERCISES: CPT | Mod: GP

## 2022-07-08 NOTE — PROGRESS NOTES
[facility]    OUTPATIENT Physical Therapy ORTHOPEDIC EVALUATION  PLAN OF TREATMENT FOR OUTPATIENT REHABILITATION  (COMPLETE FOR INITIAL CLAIMS ONLY)  Patient's Last Name, First Name, M.I.  YOB: 2000  Kiko Cabrales    Provider s Name:  [facility]   Medical Record No.  [id number]   Start of Care Date:  07/08/22   Onset Date:   06/08/22   Type:     _X__PT   ___OT Medical Diagnosis:    Encounter Diagnosis   Name Primary?    Scoliosis of thoracic spine, unspecified scoliosis type         Treatment Diagnosis:  LBP        Goals:     07/08/22 0500   Body Part   Goals listed below are for Back Pain   Goal #1   Goal #1 sitting   Previous Functional Level No restrictions   Current Functional Level Minutes patient can sit   Performance level <30 minutes   STG Target Performance Minutes patient will be able to sit   Performance level 30 minutes with pain <5/10   Rationale for personal hygiene;to allow rest from standing;for community transportation;for job requirements in their work place   Due date 07/29/22   LTG Target Performance Hours patient will be able to sit   Performance Level 1 hour with pain <5/10   Rationale for personal hygiene;to allow rest from standing;for community transportation;for job requirements in their work place   Due date 08/19/22       Therapy Frequency:  1x/week  Predicted Duration of Therapy Intervention:  8 weeks    Shahab Potter PT                 I CERTIFY THE NEED FOR THESE SERVICES FURNISHED UNDER        THIS PLAN OF TREATMENT AND WHILE UNDER MY CARE     (Physician attestation of this document indicates review and certification of the therapy plan).                     Certification Date From:  07/08/22   Certification Date To:  09/02/22    Referring Provider:  Dane Resendiz    Initial Assessment        See Epic Evaluation SOC Date:  07/08/22

## 2022-07-08 NOTE — PROGRESS NOTES
Physical Therapy Initial Evaluation  7/8/2022     Precautions/Restrictions/MD instructions: Evaluate and treat. 21 year old female with chronic back pain and hx thoracic scoliosis.    Therapist Assessment: Kiko Cabrales is a 21 year old female patient presenting to Physical Therapy with acute on chronic low back pain. Patient demonstrates pain with lumbar extension, glute and core weakness and poor posture. These impairments limit their ability to perform job requirementsand sit without pain. Skilled PT services are necessary in order to reduce impairments and improve independent function.    SUBJECTIVE    Chief Complaint: Back pain  Paresthesia (yes/no): no  Onset date: 6/8/22  Symptoms commenced as a result of: insidious    Condition occurred in the following environment:   Community    Pain severity:  0/10 current, 5/10 worst  Location of pain: low back, bilaterally   Quality of Pain: achy    Better: laying supine, rest  Worse:  sitting >30 minutes, pushing, pulling, lifting at work  Time of day: at the end of the day  Progression of Symptoms since onset:  Since onset, these symptoms are getting worse  Previous treatment: has included none  Previous Functional Status:  fully functional prior to pain onset/injury.        General health as reported by patient: good.    PMH: diagnosed with thoracic scoliosis at 12-13 years old , see epic   Surgical history/trauma:  None. She denies any significant current illness or recent hospital admissions. She denies any regional implanted devices.  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[employer]  Job duties: lifting, carrying, prolonged sitting, prolonged standing, pushing, pulling   Current exercise regimen/Recreational activities: walking on treadmill   Barriers to treatment: none    Red Flags: (Bold when present) - reviewed the following and denies  Cauda equina: progressive motor or  sensory loss, urinary retention or overflow incontinence, new fecal incontinence, saddle anesthesia, significant motor deficits encompassing multiple nerve roots  Fracture: Significant trauma, prolonged corticosteroid use, osteoporosis, age >70  Infection: spinal procedure in the last 12 months, IV drug use, immunosuppression, fever, wound in spinal region, generally unwell  Malignancy: history of metastatic cancer, unexplained weight loss      Patient's Goal(s): be more active    OBJECTIVE    Posture:   Sitting (good/fair/poor): poor  Correction of posture (better/worse/no effect): better    Lateral Shift (right/left/nil): nil      Dynamic Movement Screen  Double limb squat observations: Good technique/no significant findings      Movement Loss:   Amarjit Mod Min Nil Pain   Flexion    x stretch   Extension   x  Midline low back   Side Gliding R    x    Side Gliding L    x      Tested Movements  Directional Preference: trial extension  Repeated Extension Test: improved symptoms after prone press ups      Other Tests:   ANICETO:  + L groin   Core: Poor TrA activation in hooklying  Joint mobility: Hypomobile L 1-2, T12    Hip and Knee Strength   MMT Hip Abduction Hip Flexion   Left 3/5 5-/5   Right 3/5 5/5     Neural Tension:   Slump: negative B  SLR: negative B    ASSESSMENT/PLAN  Patient is a 21 year old female with lumbar complaints.    Patient has the following significant findings with corresponding treatment plan.                Diagnosis 1:  Low back pain    Pain -  hot/cold therapy, manual therapy, self management, education, directional preference exercise and home program  Decreased ROM/flexibility - manual therapy, therapeutic exercise and home program  Decreased strength - therapeutic exercise, therapeutic activities and home program  Impaired posture - neuro re-education and home program    Therapy Evaluation Codes:   1) History comprised of:   Personal factors that impact the plan of care:      None.     Comorbidity factors that impact the plan of care are:      None.     Medications impacting care: None.  2) Examination of Body Systems comprised of:   Body structures and functions that impact the plan of care:      Lumbar spine.   Activity limitations that impact the plan of care are:      Bending, Driving, Lifting, Sitting and Working.  3) Clinical presentation characteristics are:   Stable/Uncomplicated.  4) Decision-Making    Low complexity using standardized patient assessment instrument and/or measureable assessment of functional outcome.  Cumulative Therapy Evaluation is: Low complexity.    Previous and current functional limitations:  (See Goal Flow Sheet for this information)    Short term and Long term goals: (See Goal Flow Sheet for this information)     Communication ability:  Patient appears to be able to clearly communicate and understand verbal and written communication and follow directions correctly.  Treatment Explanation - The following has been discussed with the patient:   RX ordered/plan of care  Anticipated outcomes  Possible risks and side effects  This patient would benefit from PT intervention to resume normal activities.   Rehab potential is good.    Frequency:  1 X week, once daily  Duration:  for 6 weeks  Discharge Plan:  Achieve all LTG.  Independent in home treatment program.  Reach maximal therapeutic benefit.    Please refer to the daily flowsheet for treatment today, total treatment time and time spent performing 1:1 timed codes.

## 2022-07-15 ENCOUNTER — THERAPY VISIT (OUTPATIENT)
Dept: PHYSICAL THERAPY | Facility: CLINIC | Age: 22
End: 2022-07-15
Payer: COMMERCIAL

## 2022-07-15 DIAGNOSIS — M41.9 SCOLIOSIS OF THORACIC SPINE, UNSPECIFIED SCOLIOSIS TYPE: Primary | ICD-10-CM

## 2022-07-15 PROCEDURE — 97530 THERAPEUTIC ACTIVITIES: CPT | Mod: GP

## 2022-07-15 PROCEDURE — 97110 THERAPEUTIC EXERCISES: CPT | Mod: GP

## 2022-12-05 ENCOUNTER — VIRTUAL VISIT (OUTPATIENT)
Dept: FAMILY MEDICINE | Facility: CLINIC | Age: 22
End: 2022-12-05
Payer: COMMERCIAL

## 2022-12-05 DIAGNOSIS — J98.01 POST-INFECTION BRONCHOSPASM: Primary | ICD-10-CM

## 2022-12-05 DIAGNOSIS — N94.6 DYSMENORRHEA: ICD-10-CM

## 2022-12-05 PROCEDURE — 99213 OFFICE O/P EST LOW 20 MIN: CPT | Mod: 95 | Performed by: FAMILY MEDICINE

## 2022-12-05 RX ORDER — IBUPROFEN 600 MG/1
600 TABLET, FILM COATED ORAL EVERY 6 HOURS PRN
Qty: 100 TABLET | Refills: 3 | Status: CANCELLED | OUTPATIENT
Start: 2022-12-05

## 2022-12-05 RX ORDER — ALBUTEROL SULFATE 90 UG/1
1-2 AEROSOL, METERED RESPIRATORY (INHALATION) EVERY 6 HOURS PRN
Qty: 18 G | Refills: 1 | Status: SHIPPED | OUTPATIENT
Start: 2022-12-05 | End: 2023-11-14

## 2022-12-05 RX ORDER — INHALER, ASSIST DEVICES
SPACER (EA) MISCELLANEOUS
Qty: 1 EACH | Refills: 0 | Status: SHIPPED | OUTPATIENT
Start: 2022-12-05

## 2022-12-05 RX ORDER — IBUPROFEN 600 MG/1
600 TABLET, FILM COATED ORAL EVERY 6 HOURS PRN
Qty: 100 TABLET | Refills: 3 | Status: SHIPPED | OUTPATIENT
Start: 2022-12-05

## 2022-12-05 ASSESSMENT — ENCOUNTER SYMPTOMS: COUGH: 1

## 2022-12-05 NOTE — PROGRESS NOTES
"Kiko is a 22 year old who is being evaluated via a billable video visit.      How would you like to obtain your AVS? Mail a copy  If the video visit is dropped, the invitation should be resent by: Text to cell phone: [phone]  Will anyone else be joining your video visit? No        Assessment & Plan     Post-infection bronchospasm    Patient likely has post infectious bronchospasm from likely influenza.  Albuterol as needed and I let the patient know she may have a lingering cough for another 2 to 3 weeks.  I reminded the patient to get her flu shot and COVID booster.    - albuterol (PROAIR HFA/PROVENTIL HFA/VENTOLIN HFA) 108 (90 Base) MCG/ACT inhaler; Inhale 1-2 puffs into the lungs every 6 hours as needed for shortness of breath / dyspnea or wheezing  - spacer (OPTICHAMBER ALMA) holding chamber; Use with albuterol prn    Dysmenorrhea  I have refilled the patient's Advil.  I have reminded her to come in for a Pap smear physical exam  - ibuprofen (ADVIL/MOTRIN) 600 MG tablet; Take 1 tablet (600 mg) by mouth every 6 hours as needed for moderate pain (4-6)    20 minutes spent on the date of the encounter doing chart review, history and exam, documentation and further activities per the note       BMI:   Estimated body mass index is 30.19 kg/m  as calculated from the following:    Height as of 6/28/22: 1.626 m (5' 4\").    Weight as of 6/28/22: 79.8 kg (175 lb 14.4 oz).         No follow-ups on file.    Debbi Ricks DO  [facility]    Tashi Hoover is a 22 year old, presenting for the following health issues:  Cough      Cough    History of Present Illness       Reason for visit:  Cough  Symptom onset:  1-2 weeks ago  Symptoms include:  Ongoing cough, clear phelgm  Symptom intensity:  Mild  Symptom progression:  Staying the same  Had these symptoms before:  No    She eats 0-1 servings of fruits and vegetables daily.She consumes 0 sweetened beverage(s) daily.She exercises with " enough effort to increase her heart rate 9 or less minutes per day.  She exercises with enough effort to increase her heart rate 3 or less days per week.   She is taking medications regularly.       Acute Illness  Acute illness concerns: Cough  Onset/Duration: 2-3 weeks  Symptoms:  Fever: No  Chills/Sweats: No  Headache (location?): No  Sinus Pressure: YES  Conjunctivitis:  No  Ear Pain: no  Rhinorrhea: No  Congestion: YES  Sore Throat: No  Cough: YES-productive of clear sputum  Wheeze: No  Decreased Appetite: YES  Nausea: No  Vomiting: No  Diarrhea: No  Dysuria/Freq.: No  Dysuria or Hematuria: No  Fatigue/Achiness: YES  Sick/Strep Exposure: No  Therapies tried and outcome: Hot tea, negative home covid     She thinks it was influenza.  She had fever and chills at that time.  She had friends who were positive for influenza.  She was feeling better with a mild lingering cough.  She was kind of sob a week ago but this has improved.  She denies a history or asthma or smoking.  She has had some wheezing at times.      Review of Systems   Respiratory: Positive for cough.       Constitutional, HEENT, cardiovascular, pulmonary, gi and gu systems are negative, except as otherwise noted.      Objective           Vitals:  No vitals were obtained today due to virtual visit.    Physical Exam   GENERAL: Healthy, alert and no distress  EYES: Eyes grossly normal to inspection.  No discharge or erythema, or obvious scleral/conjunctival abnormalities.  RESP: No audible wheeze, cough, or visible cyanosis.  No visible retractions or increased work of breathing.    SKIN: Visible skin clear. No significant rash, abnormal pigmentation or lesions.  NEURO: Cranial nerves grossly intact.  Mentation and speech appropriate for age.  PSYCH: Mentation appears normal, affect normal/bright, judgement and insight intact, normal speech and appearance well-groomed.            The patient was unable to connect via video so this was converted to a  telephone visit.    Duration 15 minutes

## 2023-03-29 ENCOUNTER — E-VISIT (OUTPATIENT)
Dept: URGENT CARE | Facility: CLINIC | Age: 23
End: 2023-03-29
Payer: COMMERCIAL

## 2023-03-29 DIAGNOSIS — R30.0 DIFFICULT OR PAINFUL URINATION: Primary | ICD-10-CM

## 2023-03-29 PROCEDURE — 99421 OL DIG E/M SVC 5-10 MIN: CPT | Performed by: NURSE PRACTITIONER

## 2023-03-30 NOTE — PATIENT INSTRUCTIONS
Dear Kiko Cabrales,     After reviewing your responses, I would like you to come in for a urine test to make sure we treat you correctly. This urine test is to evaluate you for a possible urinary tract infection, and should be scheduled for today or tomorrow. Schedule a Lab Only appointment here.     Lab appointments are not available at most locations on the weekends. If no Lab Only appointment is available, you should be seen in any of our convenient Walk-in or Urgent Care Centers, which can be found on our website here.     You will receive instructions with your results in [facility] once they are available.     If your symptoms worsen, you develop pain in your back or stomach, develop fevers, or are not improving in 5 days, please contact your primary care provider for an appointment or visit a Walk-in or Urgent Care Center to be seen.     Thanks again for choosing us as your health care partner,     Gretel Ngo, CNP

## 2023-04-15 ENCOUNTER — HEALTH MAINTENANCE LETTER (OUTPATIENT)
Age: 23
End: 2023-04-15

## 2023-05-12 NOTE — PROGRESS NOTES
Discharge Note    Progress reporting period is from initial evaluation date (please see noted date below) to Jul 15, 2022.  Linked Episodes   Type: Episode: Status: Noted: Resolved: Last update: Updated by:   PHYSICAL THERAPY Back pain 7/8/22 Active 7/8/2022  7/15/2022  2:53 PM Shahab Potter, PT      Comments:       Kiko failed to follow up and current status is unknown.  Please see information below for last relevant information on current status.  Patient seen for 2 visits.    SUBJECTIVE  Subjective changes noted by patient:  Back pain is about the same, still having pain if sitting >30 minutes.  .  Current pain level is  .     Previous pain level was  5/10 (worst).   Changes in function:  Yes (See Goal flowsheet attached for changes in current functional level)  Adverse reaction to treatment or activity: None    OBJECTIVE  Changes noted in objective findings: Poor TrA contraction seated. Required several cues for exercise form. Poor body mechanics when lifting from the floor- demo all spinal flexion     ASSESSMENT/PLAN  Diagnosis: LBP   Updated problem list and treatment plan:   Pain - HEP  STG/LTGs have been met or progress has been made towards goals:  Yes, please see goal flowsheet for most current information  Assessment of Progress: current status is unknown.    Last current status:     Self Management Plans:  HEP  I have re-evaluated this patient and find that the nature, scope, duration and intensity of the therapy is appropriate for the medical condition of the patient.  Kiko continues to require the following intervention to meet STG and LTG's:  HEP.    Recommendations:  Discharge with current home program.  Patient to follow up with MD as needed.    Please refer to the daily flowsheet for treatment today, total treatment time and time spent performing 1:1 timed codes.

## 2023-11-07 ENCOUNTER — TELEPHONE (OUTPATIENT)
Dept: FAMILY MEDICINE | Facility: CLINIC | Age: 23
End: 2023-11-07
Payer: COMMERCIAL

## 2023-11-07 ENCOUNTER — OFFICE VISIT (OUTPATIENT)
Dept: URGENT CARE | Facility: URGENT CARE | Age: 23
End: 2023-11-07
Payer: COMMERCIAL

## 2023-11-07 VITALS
WEIGHT: 175.3 LBS | BODY MASS INDEX: 30.09 KG/M2 | RESPIRATION RATE: 16 BRPM | DIASTOLIC BLOOD PRESSURE: 82 MMHG | SYSTOLIC BLOOD PRESSURE: 127 MMHG | OXYGEN SATURATION: 98 % | HEART RATE: 114 BPM | TEMPERATURE: 102.2 F

## 2023-11-07 DIAGNOSIS — R07.0 THROAT PAIN: ICD-10-CM

## 2023-11-07 DIAGNOSIS — J02.0 STREP THROAT: Primary | ICD-10-CM

## 2023-11-07 LAB — DEPRECATED S PYO AG THROAT QL EIA: POSITIVE

## 2023-11-07 PROCEDURE — 87880 STREP A ASSAY W/OPTIC: CPT | Performed by: PHYSICIAN ASSISTANT

## 2023-11-07 PROCEDURE — 99213 OFFICE O/P EST LOW 20 MIN: CPT | Performed by: PHYSICIAN ASSISTANT

## 2023-11-07 RX ORDER — AMOXICILLIN 500 MG/1
500 CAPSULE ORAL 2 TIMES DAILY
Qty: 20 CAPSULE | Refills: 0 | Status: SHIPPED | OUTPATIENT
Start: 2023-11-07 | End: 2023-11-17

## 2023-11-07 RX ORDER — IBUPROFEN 800 MG/1
800 TABLET, FILM COATED ORAL EVERY 8 HOURS PRN
Qty: 30 TABLET | Refills: 0 | Status: SHIPPED | OUTPATIENT
Start: 2023-11-07 | End: 2023-11-14

## 2023-11-07 ASSESSMENT — PAIN SCALES - GENERAL: PAINLEVEL: EXTREME PAIN (8)

## 2023-11-07 NOTE — LETTER
November 7, 2023      Kiko Cabrales  [address] [place] [id number]        To Whom It May Concern:    Kiko Cabrales  was seen on 11/7 for strep.  Please excuse from work. May return to work 11/9/2023.        Sincerely,        Amelia Cat PA-C

## 2023-11-07 NOTE — TELEPHONE ENCOUNTER
RN received call from patient.    Patient states she has sore throat and body aches.    Patients throat is red and notes white creamy spots.    Patient has not tested for Covid.    RN advised patient should be seen today.    RN advised there were virtual visits avaliable today at end of day    RN advised patient should go to urgent care to be evaluated.    Patient will try to find ride and call back if unable to possible do vitual visit.    Delfino Oneal RN, BSN, PHN  [facility]

## 2023-11-07 NOTE — PROGRESS NOTES
Chief Complaint   Patient presents with    Throat Pain     Throat pain beginning three days ago     Fever     Fever beginning two days ago. Last fever-reducing medication taken last night.                  ASSESSMENT:     ICD-10-CM    1. Strep throat  J02.0 amoxicillin (AMOXIL) 500 MG capsule     ibuprofen (ADVIL/MOTRIN) 800 MG tablet      2. Throat pain  R07.0 Streptococcus A Rapid Screen w/Reflex to PCR - Clinic Collect     amoxicillin (AMOXIL) 500 MG capsule     ibuprofen (ADVIL/MOTRIN) 800 MG tablet            PLAN: Strep throat.  Amoxicillin.  Salt water gargles.  Ibuprofen prescription as needed for pain.  Work slip given.  I have discussed clinical findings with patient.  Side effects of medications discussed.  Symptomatic care is discussed.  I have discussed the possibility of  worsening symptoms and indication to RTC or go to the ER if they occur.  All questions are answered, patient indicates understanding of these issues and is in agreement with plan.   Patient care instructions are discussed/given at the end of visit.   Lots of rest and fluids.      Amelia Cat PA-C      SUBJECTIVE:  24 yo presents for sore throat that started 3 days ago and fever that started 2 days ago.  Also with body aches, specifically back pain.  No vomiting.  No rash.  Here with mom.      No Known Allergies    Past Medical History:   Diagnosis Date    Forearm fracture 2004    LT    Seasonal allergies        albuterol (PROAIR HFA/PROVENTIL HFA/VENTOLIN HFA) 108 (90 Base) MCG/ACT inhaler, Inhale 1-2 puffs into the lungs every 6 hours as needed for shortness of breath / dyspnea or wheezing  ibuprofen (ADVIL/MOTRIN) 600 MG tablet, Take 1 tablet (600 mg) by mouth every 6 hours as needed for moderate pain (4-6)  spacer (OPTICHAMBER ALMA) holding chamber, Use with albuterol prn    No current facility-administered medications on file prior to visit.      Social History     Tobacco Use    Smoking status: Never     Passive  exposure: Never    Smokeless tobacco: Never   Substance Use Topics    Alcohol use: No       ROS:  CONSTITUTIONAL: Negative for fatigue or fever.  EYES: Negative for eye problems.  ENT: As above.  RESP: As above.  CV: Negative for chest pains.  GI: Negative for vomiting.  MUSCULOSKELETAL:  Negative for significant muscle or joint pains.  NEUROLOGIC: Negative for headaches.  SKIN: Negative for rash.  PSYCH: Normal mentation for age.    OBJECTIVE:  /82 (BP Location: Left arm, Patient Position: Sitting, Cuff Size: Adult Regular)   Pulse 114   Temp (!) 102.2  F (39  C) (Tympanic)   Resp 16   Wt 79.5 kg (175 lb 4.8 oz)   SpO2 98%   BMI 30.09 kg/m    GENERAL APPEARANCE: Healthy, alert and no distress.  No tripoding, potato voice, trismus, drooling.  EYES:Conjunctiva/sclera clear.  EARS: No cerumen.   Ear canals w/o erythema.  TM's intact w/o erythema.    THROAT: Moderate  erythema with 1+ tonsillar enlargement.  Uvula midline.  No exudates.  NECK: Supple, nontender, no lymphadenopathy.  RESP: Lungs clear to auscultation - no rales, rhonchi or wheezes  CV: Regular rate and rhythm, normal S1 S2, no murmur noted.  NEURO: Awake, alert    SKIN: No rashes    Results for orders placed or performed in visit on 11/07/23   Streptococcus A Rapid Screen w/Reflex to PCR - Clinic Collect     Status: Abnormal    Specimen: Throat; Swab   Result Value Ref Range    Group A Strep antigen Positive (A) Negative       Amelia Cat PA-C

## 2023-11-14 ENCOUNTER — OFFICE VISIT (OUTPATIENT)
Dept: FAMILY MEDICINE | Facility: CLINIC | Age: 23
End: 2023-11-14
Payer: COMMERCIAL

## 2023-11-14 VITALS
DIASTOLIC BLOOD PRESSURE: 83 MMHG | HEIGHT: 64 IN | WEIGHT: 179.6 LBS | OXYGEN SATURATION: 99 % | BODY MASS INDEX: 30.66 KG/M2 | RESPIRATION RATE: 16 BRPM | TEMPERATURE: 98.2 F | SYSTOLIC BLOOD PRESSURE: 117 MMHG | HEART RATE: 98 BPM

## 2023-11-14 DIAGNOSIS — K92.1 BLOOD IN STOOL: ICD-10-CM

## 2023-11-14 DIAGNOSIS — E66.811 CLASS 1 OBESITY DUE TO EXCESS CALORIES WITHOUT SERIOUS COMORBIDITY WITH BODY MASS INDEX (BMI) OF 31.0 TO 31.9 IN ADULT: ICD-10-CM

## 2023-11-14 DIAGNOSIS — E66.09 CLASS 1 OBESITY DUE TO EXCESS CALORIES WITHOUT SERIOUS COMORBIDITY WITH BODY MASS INDEX (BMI) OF 31.0 TO 31.9 IN ADULT: ICD-10-CM

## 2023-11-14 DIAGNOSIS — N92.6 IRREGULAR PERIODS: ICD-10-CM

## 2023-11-14 DIAGNOSIS — Z11.59 NEED FOR HEPATITIS C SCREENING TEST: ICD-10-CM

## 2023-11-14 DIAGNOSIS — Z11.4 SCREENING FOR HIV (HUMAN IMMUNODEFICIENCY VIRUS): ICD-10-CM

## 2023-11-14 DIAGNOSIS — Z00.00 ENCOUNTER FOR ANNUAL PHYSICAL EXAM: Primary | ICD-10-CM

## 2023-11-14 DIAGNOSIS — M41.125 ADOLESCENT IDIOPATHIC SCOLIOSIS OF THORACOLUMBAR REGION: ICD-10-CM

## 2023-11-14 DIAGNOSIS — Z12.4 CERVICAL CANCER SCREENING: ICD-10-CM

## 2023-11-14 DIAGNOSIS — F43.9 STRESS: ICD-10-CM

## 2023-11-14 LAB — HIV 1+2 AB+HIV1 P24 AG SERPL QL IA: NONREACTIVE

## 2023-11-14 PROCEDURE — 87389 HIV-1 AG W/HIV-1&-2 AB AG IA: CPT | Performed by: PHYSICIAN ASSISTANT

## 2023-11-14 PROCEDURE — 90471 IMMUNIZATION ADMIN: CPT | Performed by: PHYSICIAN ASSISTANT

## 2023-11-14 PROCEDURE — 36415 COLL VENOUS BLD VENIPUNCTURE: CPT | Performed by: PHYSICIAN ASSISTANT

## 2023-11-14 PROCEDURE — 90715 TDAP VACCINE 7 YRS/> IM: CPT | Performed by: PHYSICIAN ASSISTANT

## 2023-11-14 PROCEDURE — G0145 SCR C/V CYTO,THINLAYER,RESCR: HCPCS | Performed by: PHYSICIAN ASSISTANT

## 2023-11-14 PROCEDURE — 86803 HEPATITIS C AB TEST: CPT | Performed by: PHYSICIAN ASSISTANT

## 2023-11-14 PROCEDURE — 90686 IIV4 VACC NO PRSV 0.5 ML IM: CPT | Performed by: PHYSICIAN ASSISTANT

## 2023-11-14 PROCEDURE — 90472 IMMUNIZATION ADMIN EACH ADD: CPT | Performed by: PHYSICIAN ASSISTANT

## 2023-11-14 PROCEDURE — 99395 PREV VISIT EST AGE 18-39: CPT | Mod: 25 | Performed by: PHYSICIAN ASSISTANT

## 2023-11-14 PROCEDURE — 99214 OFFICE O/P EST MOD 30 MIN: CPT | Mod: 25 | Performed by: PHYSICIAN ASSISTANT

## 2023-11-14 RX ORDER — NORETHINDRONE ACETATE 5 MG
1 TABLET ORAL
COMMUNITY
Start: 2023-02-07 | End: 2023-11-14

## 2023-11-14 ASSESSMENT — ENCOUNTER SYMPTOMS
BREAST MASS: 0
HEMATOCHEZIA: 1
NERVOUS/ANXIOUS: 1
FREQUENCY: 0
EYE PAIN: 0
CHILLS: 0
DIARRHEA: 0
PALPITATIONS: 0
WEAKNESS: 0
SHORTNESS OF BREATH: 0
JOINT SWELLING: 0
CONSTIPATION: 1
FEVER: 0
DYSURIA: 0
NAUSEA: 0
SORE THROAT: 0
PARESTHESIAS: 0
HEARTBURN: 1
HEADACHES: 0
MYALGIAS: 0
ABDOMINAL PAIN: 0
ARTHRALGIAS: 0
HEMATURIA: 0
DIZZINESS: 0
COUGH: 0

## 2023-11-14 ASSESSMENT — PAIN SCALES - GENERAL: PAINLEVEL: NO PAIN (0)

## 2023-11-14 NOTE — NURSING NOTE
Prior to immunization administration, verified patients identity using patient s name and date of birth. Please see Immunization Activity for additional information.     Screening Questionnaire for Adult Immunization    Are you sick today?   No   Do you have allergies to medications, food, a vaccine component or latex?   No   Have you ever had a serious reaction after receiving a vaccination?   No   Do you have a long-term health problem with heart, lung, kidney, or metabolic disease (e.g., diabetes), asthma, a blood disorder, no spleen, complement component deficiency, a cochlear implant, or a spinal fluid leak?  Are you on long-term aspirin therapy?   No   Do you have cancer, leukemia, HIV/AIDS, or any other immune system problem?   No   Do you have a parent, brother, or sister with an immune system problem?   No   In the past 3 months, have you taken medications that affect  your immune system, such as prednisone, other steroids, or anticancer drugs; drugs for the treatment of rheumatoid arthritis, Crohn s disease, or psoriasis; or have you had radiation treatments?   No   Have you had a seizure, or a brain or other nervous system problem?   No   During the past year, have you received a transfusion of blood or blood    products, or been given immune (gamma) globulin or antiviral drug?   No   For women: Are you pregnant or is there a chance you could become       pregnant during the next month?   No   Have you received any vaccinations in the past 4 weeks?   No     Immunization questionnaire answers were all negative.      Patient instructed to remain in clinic for 15 minutes afterwards, and to report any adverse reactions.     Screening performed by Zana Álvarez on 11/14/2023 at 8:45 AM.

## 2023-11-14 NOTE — PROGRESS NOTES
SUBJECTIVE:   CC: Kiko is an 23 year old who presents for preventive health visit.       Healthy Habits:     Getting at least 3 servings of Calcium per day:  Yes    Bi-annual eye exam:  NO    Dental care twice a year:  Yes    Sleep apnea or symptoms of sleep apnea:  Daytime drowsiness    Diet:  Regular (no restrictions)    Frequency of exercise:  1 day/week    Duration of exercise:  15-30 minutes    Taking medications regularly:  Yes    Medication side effects:  None    Additional concerns today:  Yes    Additional provider notes :   Blood in stool - Yesterday she noticed blood after having a bowel movement with wiping. She is on antibiotics for strep and wondering if antibiotic causing it.  It was only a little bit of blood when wiping. She has hx of hemorrhoids. No blood in stool or toilet. No pain with bowel movements. No fatigue or lightheadedness  Weight gain - over the last few years she started to gain 20-30 lbs. She is now trying to lose weight over the last 4 months with diet and exercise. Looking for more help with this.   Menses - 2 months ago was got her period twice in a month. That is the first time it happened to her. It was a stressful time at work. She changed jobs to full time. She is having more anxiety lately with the change. Patient's last menstrual period was 10/27/2023 (approximate).        Have you ever done Advance Care Planning? (For example, a Health Directive, POLST, or a discussion with a medical provider or your loved ones about your wishes): No, advance care planning information given to patient to review.  Patient declined advance care planning discussion at this time.    Social History     Tobacco Use    Smoking status: Never     Passive exposure: Never    Smokeless tobacco: Never   Substance Use Topics    Alcohol use: No           11/14/2023     7:45 AM   Alcohol Use   Prescreen: >3 drinks/day or >7 drinks/week? No     Reviewed orders with patient.  Reviewed health  "maintenance and updated orders accordingly - Yes  Lab work is in process    Breast Cancer Screening:        History of abnormal Pap smear: NO - age 21-29 PAP every 3 years recommended     Reviewed and updated as needed this visit by clinical staff   Tobacco  Allergies  Meds      Soc Hx        Reviewed and updated as needed this visit by Provider                   Review of Systems   Constitutional:  Negative for chills and fever.   HENT:  Negative for congestion, ear pain, hearing loss and sore throat.    Eyes:  Negative for pain and visual disturbance.   Respiratory:  Negative for cough and shortness of breath.    Cardiovascular:  Negative for chest pain, palpitations and peripheral edema.   Gastrointestinal:  Positive for constipation, heartburn and hematochezia. Negative for abdominal pain, diarrhea and nausea.   Breasts:  Negative for tenderness, breast mass and discharge.   Genitourinary:  Positive for vaginal discharge. Negative for dysuria, frequency, genital sores, hematuria, pelvic pain, urgency and vaginal bleeding.   Musculoskeletal:  Negative for arthralgias, joint swelling and myalgias.   Skin:  Negative for rash.   Neurological:  Negative for dizziness, weakness, headaches and paresthesias.   Psychiatric/Behavioral:  Positive for mood changes. The patient is nervous/anxious.           OBJECTIVE:   /83   Pulse 98   Temp 98.2  F (36.8  C) (Oral)   Resp 16   Ht 1.62 m (5' 3.78\")   Wt 81.5 kg (179 lb 9.6 oz)   LMP 10/27/2023 (Approximate)   SpO2 99%   BMI 31.04 kg/m    Physical Exam  GENERAL: healthy, alert and no distress  EYES: Eyes grossly normal to inspection, PERRL and conjunctivae and sclerae normal  HENT: ear canals and TM's normal, nose and mouth without ulcers or lesions  NECK: no adenopathy, no asymmetry, masses, or scars and thyroid normal to palpation  RESP: lungs clear to auscultation - no rales, rhonchi or wheezes  CV: regular rate and rhythm, normal S1 S2, no S3 or S4, no " murmur, click or rub, no peripheral edema and peripheral pulses strong  ABDOMEN: soft, nontender, no hepatosplenomegaly, no masses and bowel sounds normal   (female): normal female external genitalia, normal urethral meatus, vaginal mucosa pink, moist, large amount of blood in vaginal canal, and from cervical opening, well rugated, and normal cervix/adnexa/uterus without masses or discharge  MS: no gross musculoskeletal defects noted, no edema  SKIN: no suspicious lesions or rashes  NEURO: Normal strength and tone, mentation intact and speech normal  PSYCH: mentation appears normal, affect normal/bright    Diagnostic Test Results:  Labs reviewed in Epic    ASSESSMENT/PLAN:   (Z00.00) Encounter for annual physical exam  (primary encounter diagnosis)  Comment: repeat 1 yr    (Z11.4) Screening for HIV (human immunodeficiency virus)  Plan: HIV Antigen Antibody Combo            (Z11.59) Need for hepatitis C screening test  Plan: Hepatitis C Screen Reflex to HCV RNA Quant and         Genotype            (Z12.4) Cervical cancer screening  Plan: Pap Screen only - recommended age 21 - 24 years            (M41.125) Adolescent idiopathic scoliosis of thoracolumbar region  Comment: Chronic, worsening pain since weight ggain over the last few years. Stopped physical therapy. Suggested restarting but patient declined at this time    (K92.1) Blood in stool  Comment: No abnormal rectal findings on exam. However, on exam it was found patient has started her period with lots of bleeding which I think could be the cause of the blood with wiping. If this continues after her period then follow up in clinic. Patient agree's with this plan and has no further questions      (E66.09,  Z68.31) Class 1 obesity due to excess calories without serious comorbidity with body mass index (BMI) of 31.0 to 31.9 in adult  Comment: Discussed diet and exercise today. Per patient request referral placed for more help with weight loss  Plan: Adult  Comprehensive Weight Management         Referral            (N92.6) Irregular periods  Comment: Acute, only for 1 month. Advised to continue to monitor if this persist for 3-6 months then further evaluation will be done.      (F43.9) Stress  Comment: Discussed evaluation for anxiety or medications but patient declined. She is managing well on her own right now. However, if work gets more stressful then she will make an appointment with me to follow up      Patient has been advised of split billing requirements and indicates understanding: Yes      COUNSELING:  Reviewed preventive health counseling, as reflected in patient instructions       Regular exercise       Healthy diet/nutrition       Immunizations  Vaccinated for: Influenza and TDAP          She reports that she has never smoked. She has never been exposed to tobacco smoke. She has never used smokeless tobacco.          NOEL Hawkins  [facility]

## 2023-11-15 LAB — HCV AB SERPL QL IA: NONREACTIVE

## 2023-11-17 LAB
BKR LAB AP GYN ADEQUACY: NORMAL
BKR LAB AP GYN INTERPRETATION: NORMAL
BKR LAB AP HPV REFLEX: NO
BKR LAB AP PREVIOUS ABNORMAL: NORMAL
PATH REPORT.COMMENTS IMP SPEC: NORMAL
PATH REPORT.COMMENTS IMP SPEC: NORMAL
PATH REPORT.RELEVANT HX SPEC: NORMAL

## 2024-02-27 ENCOUNTER — VIRTUAL VISIT (OUTPATIENT)
Dept: ENDOCRINOLOGY | Facility: CLINIC | Age: 24
End: 2024-02-27
Payer: COMMERCIAL

## 2024-02-27 DIAGNOSIS — E66.811 CLASS 1 OBESITY DUE TO EXCESS CALORIES WITHOUT SERIOUS COMORBIDITY WITH BODY MASS INDEX (BMI) OF 31.0 TO 31.9 IN ADULT: ICD-10-CM

## 2024-02-27 DIAGNOSIS — E66.09 CLASS 1 OBESITY DUE TO EXCESS CALORIES WITHOUT SERIOUS COMORBIDITY WITH BODY MASS INDEX (BMI) OF 31.0 TO 31.9 IN ADULT: ICD-10-CM

## 2024-02-27 PROCEDURE — 99204 OFFICE O/P NEW MOD 45 MIN: CPT | Mod: 95 | Performed by: INTERNAL MEDICINE

## 2024-02-27 RX ORDER — PHENTERMINE HYDROCHLORIDE 15 MG/1
15 CAPSULE ORAL EVERY MORNING
Qty: 30 CAPSULE | Refills: 2 | Status: SHIPPED | OUTPATIENT
Start: 2024-02-27 | End: 2024-03-08

## 2024-02-27 ASSESSMENT — PAIN SCALES - GENERAL: PAINLEVEL: NO PAIN (0)

## 2024-02-27 NOTE — PROGRESS NOTES
"    New Medical Weight Management Consult    PATIENT:  Kiko Cabrales  MRN:         [id number]  :         2000  MARIA VICTORIA:         2024    Dear PCP,    I had the pleasure of seeing your patient, Kiko Cabrales. Full intake/assessment was done to determine barriers to weight loss success and develop a treatment plan. Kiko Cabrales is a 23 year old female interested in treatment of medical problems associated with excess weight. She has a height of 5' 0\", a weight of 179 lbs 10.8 oz, and the calculated Body mass index is 35.09 kg/m .    She has the following co-morbidities: back pain, fatigue, irregular     Weight history:  Weight has always fluctuating since she was young.   Over the last few years she started to gain 40 lbs from pandemic, craving more food, more appetite, stress eating, emotional eating, some binges during premenstrual cycle. She is now trying to lose weight with diet and exercise but did not see significant weight loss.    Attempts: focus on diet modification    Diet habit: try to eat on schedule, try not to eat until overly full  Eat twice a day  Try to eat at home mostly  Try to eat more salad, some bread, bean   Do not usually snack  Beverages: some energy drink, mostly drinking water    Exercise: twice a week -- go to the gym -- cardio  Sleep: good, 7 hours per night  Job:         2024     6:37 PM   --   I have the following health issues associated with obesity None of the above   I have the following symptoms associated with obesity Back Pain    Fatigue    Irregular Menstral Cycle           2024     6:37 PM   Referring Provider   Please name the provider who referred you to Medical Weight Management  If you do not know, please answer \"I Don't Know\" Cassandra Seymour           2024     6:37 PM   Weight History   How concerned are you about your weight? Very Concerned   I became overweight In College   The following factors have contributed to my " weight gain Mental Health Issues    Started on Medication that Caused Weight Gain    Eating Too Much    Lack of Exercise    Stress   I have tried the following methods to lose weight Watching Portions or Calories    Exercise    Meal Replacements    Fasting   My lowest weight since age 18 was 130   My highest weight since age 18 was 180   The most weight I have ever lost was (lbs) 30   I have the following family history of obesity/being overweight Many of my relatives are overweight   How has your weight changed over the last year? Gained   How many pounds? 15 lbs           2/22/2024     6:37 PM   Diet Recall Review with Patient   How many glasses of juice do you drink in a typical day? 2   How many of glasses of milk do you drink in a typical day? 0   How many 8oz glasses of sugar containing drinks such as Kalen-Aid/sweet tea do you drink in a day? 2   How many cans/bottles of sugar pop/soda/tea/sports drinks do you drink in a day? 1   How many cans/bottles of diet pop/soda/tea or sports drink do you drink in a day? 0   How often do you have a drink of alcohol? Monthly or Less   If you do drink, how many drinks might you have in a day? 1 or 2           2/22/2024     6:37 PM   Eating Habits   Generally, my meals include foods like these bread, pasta, rice, potatoes, corn, crackers, sweet dessert, pop, or juice A Few Times a Week   Generally, my meals include foods like these fried meats, brats, burgers, french fries, pizza, cheese, chips, or ice cream A Few Times a Week   Eat fast food (like McDonalds, Burger Gold, Taco Bell) Less Than Weekly   Eat at a buffet or sit-down restaurant Less Than Weekly   Eat most of my meals in front of the TV or computer A Few Times a Week   Often skip meals, eat at random times, have no regular eating times A Few Times a Week   Rarely sit down for a meal but snack or graze throughout Less Than Weekly   Eat extra snacks between meals Once a Week   Eat most of my food at the end of the  day A Few Times a Week   Eat in the middle of the night or wake up at night to eat Less Than Weekly   Eat extra snacks to prevent or correct low blood sugar Never   Eat to prevent acid reflux or stomach pain Never   Worry about not having enough food to eat Never   I eat when I am depressed A Few Times a Week   I eat when I am stressed A Few Times a Week   I eat when I am bored A Few Times a Week   I eat when I am anxious A Few Times a Week   I eat when I am happy or as a reward A Few Times a Week   I feel hungry all the time even if I just have eaten Less Than Weekly   Feeling full is important to me Once a Week   I finish all the food on my plate even if I am already full Almost Everyday   I can't resist eating delicious food or walk past the good food/smell A Few Times a Week   I eat/snack without noticing that I am eating A Few Times a Week   I eat when I am preparing the meal Less Than Weekly   I eat more than usual when I see others eating A Few Times a Week   I have trouble not eating sweets, ice cream, cookies, or chips if they are around the house Never   I think about food all day Less Than Weekly   What foods, if any, do you crave? None           2/22/2024     6:37 PM   Amount of Food   I feel out of control when eating Weekly   I eat a large amount of food, like a loaf of bread, a box of cookies, a pint/quart of ice cream, all at once Never   I eat a large amount of food even when I am not hungry Monthly   I eat rapidly Never   I eat alone because I feel embarrassed and do not want others to see how much I have eaten Never   I eat until I am uncomfortably full Never   I feel bad, disgusted, or guilty after I overeat Never           2/22/2024     6:37 PM   Activity/Exercise History   How much of a typical 12 hour day do you spend sitting? Most of the Day   How much of a typical 12 hour day do you spend lying down? Less Than Half the Day   How much of a typical day do you spend walking/standing? Less Than  Half the Day   How many hours (not including work) do you spend on the TV/Video Games/Computer/Tablet/Phone? 2-3 Hours   How many times a week are you active for the purpose of exercise? 2-3 Times a Week   What keeps you from being more active? Too tired   How many total minutes do you spend doing some activity for the purpose of exercising when you exercise? Less Than 15 Minutes       PAST MEDICAL HISTORY:  Past Medical History:   Diagnosis Date    Forearm fracture 01/01/2004    LT    Scoliosis     Seasonal allergies            2/22/2024     6:37 PM   Work/Social History Reviewed With Patient   My employment status is Full-Time   My job is Coordinator   How much of your job is spent on the computer or phone? 100%   How many hours do you spend commuting to work daily? Less thsn 1hours   What is your marital status? Single   Who do you live with? Mother, Father and Siblings   Who does the food shopping? Mother           2/22/2024     6:37 PM   Mental Health History Reviewed With Patient   Have you ever been physically or sexually abused? No   How often in the past 2 weeks have you felt little interest or pleasure in doing things? For Several Days   Over the past 2 weeks how often have you felt down, depressed, or hopeless? Not at all           2/22/2024     6:37 PM   Sleep History Reviewed With Patient   How many hours do you sleep at night? 8       MEDICATIONS:   Current Outpatient Medications   Medication Sig Dispense Refill    ibuprofen (ADVIL/MOTRIN) 600 MG tablet Take 1 tablet (600 mg) by mouth every 6 hours as needed for moderate pain (4-6) 100 tablet 3    spacer (OPTICHAMBER ALMA) holding chamber Use with albuterol prn (Patient not taking: Reported on 2/27/2024) 1 each 0       ALLERGIES:   No Known Allergies    PHYSICAL EXAM:  Ht 1.524 m (5')   Wt 81.5 kg (179 lb 10.8 oz)   BMI 35.09 kg/m      Waist circumference:      Wt Readings from Last 4 Encounters:   02/27/24 81.5 kg (179 lb 10.8 oz)   11/14/23 81.5  kg (179 lb 9.6 oz)   11/07/23 79.5 kg (175 lb 4.8 oz)   06/28/22 79.8 kg (175 lb 14.4 oz)     A & O x 3  HEENT: NCAT, mucous membranes moist  Respirations unlabored  Location of obesity: Mixed Obesity    ASSESSMENT/PLAN:  Kiko is a patient with mature onset obesity with significant element of familial/genetic influence and without current health consequences. She does not need aggressive weight loss plan.  Kiko Cabrales eats a high carb diet, eats a high fat diet, uses food as mood management, has perception of intense hunger, mostly eats during the evening, and has a disorganized meal pattern.    Her problem is complicated by strong craving/reward pathways, a binge eating component, and poor lifestyle choices    Her ability to lose weight is impacted by current work life, lack of confidence, and lack of education on nutrition and dietary needs.    PLAN:    Decrease portion sizes  Decrease eating out  Purge house of food triggers  Dietician visit of education  Volumetrics eating plan  Calorie/low fat diet  Meal planning  Increase activity    Craving/Reward   Ancillary testing:  N/A.  Food Plan:  High protein/low carbohydrate.   Activity Plan:  Exercise after meals.  Supplementary:  N/A.   Medication:  The patient will begin medication in pursuit of improved medical status as influenced by body weight. She will start phentermine 15 mg daily in the mid morning.  There is a mutual understanding of the goals and risks of this therapy. The patient is in agreement. She is educated on dosage regimen and possible side effects.    Orders Placed This Encounter   Procedures    Med Therapy Management Referral     Work with nutritionist on diet planning      FOLLOW-UP:   See MTM PharmD in 2 month(s)  See MD or PA in 4 month(s)    Joined the call at 2/27/2024, 10:28:28 am.  Left the call at 2/27/2024, 10:55:56 am.  You were on the call for 27 minutes 28 seconds .    External notes/medical records independently reviewed,  labs and imaging independently reviewed, medical management and tests to be discussed/communicated to patient.    Time: I spent 45 minutes spent on the date of the encounter preparing to see patient (including chart review and preparation), obtaining and or reviewing additional medical history, performing a physical exam and evaluation, documenting clinical information in the electronic health record, independently interpreting results, communicating results to the patient and coordinating care.      Sincerely,    Ignacia Pelaez MD

## 2024-02-27 NOTE — LETTER
"2024       RE: Kiko Cabrales  [address] [place] [id number]     Dear Colleague,    Thank you for referring your patient, Kiko Cabrales, to the [facility]NA[facility] [place] [facility]. Please see a copy of my visit note below.        New Medical Weight Management Consult    PATIENT:  Kiko Cabrales  MRN:         [id number]  :         2000  MARIA VICTORIA:         2024    Dear PCP,    I had the pleasure of seeing your patient, Kiko Cabrales. Full intake/assessment was done to determine barriers to weight loss success and develop a treatment plan. Kiko Cabrales is a 23 year old female interested in treatment of medical problems associated with excess weight. She has a height of 5' 0\", a weight of 179 lbs 10.8 oz, and the calculated Body mass index is 35.09 kg/m .    She has the following co-morbidities: back pain, fatigue, irregular     Weight history:  Weight has always fluctuating since she was young.   Over the last few years she started to gain 40 lbs from pandemic, craving more food, more appetite, stress eating, emotional eating, some binges during premenstrual cycle. She is now trying to lose weight with diet and exercise but did not see significant weight loss.    Attempts: focus on diet modification    Diet habit: try to eat on schedule, try not to eat until overly full  Eat twice a day  Try to eat at home mostly  Try to eat more salad, some bread, bean   Do not usually snack  Beverages: some energy drink, mostly drinking water    Exercise: twice a week -- go to the gym -- cardio  Sleep: good, 7 hours per night  Job:         2024     6:37 PM   --   I have the following health issues associated with obesity None of the above   I have the following symptoms associated with obesity Back Pain    Fatigue    Irregular Menstral Cycle           2024     6:37 PM " "  Referring Provider   Please name the provider who referred you to Medical Weight Management  If you do not know, please answer \"I Don't Know\" Cassandra Seymour           2/22/2024     6:37 PM   Weight History   How concerned are you about your weight? Very Concerned   I became overweight In College   The following factors have contributed to my weight gain Mental Health Issues    Started on Medication that Caused Weight Gain    Eating Too Much    Lack of Exercise    Stress   I have tried the following methods to lose weight Watching Portions or Calories    Exercise    Meal Replacements    Fasting   My lowest weight since age 18 was 130   My highest weight since age 18 was 180   The most weight I have ever lost was (lbs) 30   I have the following family history of obesity/being overweight Many of my relatives are overweight   How has your weight changed over the last year? Gained   How many pounds? 15 lbs           2/22/2024     6:37 PM   Diet Recall Review with Patient   How many glasses of juice do you drink in a typical day? 2   How many of glasses of milk do you drink in a typical day? 0   How many 8oz glasses of sugar containing drinks such as Kalen-Aid/sweet tea do you drink in a day? 2   How many cans/bottles of sugar pop/soda/tea/sports drinks do you drink in a day? 1   How many cans/bottles of diet pop/soda/tea or sports drink do you drink in a day? 0   How often do you have a drink of alcohol? Monthly or Less   If you do drink, how many drinks might you have in a day? 1 or 2           2/22/2024     6:37 PM   Eating Habits   Generally, my meals include foods like these bread, pasta, rice, potatoes, corn, crackers, sweet dessert, pop, or juice A Few Times a Week   Generally, my meals include foods like these fried meats, brats, burgers, french fries, pizza, cheese, chips, or ice cream A Few Times a Week   Eat fast food (like McDonalds, Burger Gold, Taco Bell) Less Than Weekly   Eat at a buffet or sit-down " restaurant Less Than Weekly   Eat most of my meals in front of the TV or computer A Few Times a Week   Often skip meals, eat at random times, have no regular eating times A Few Times a Week   Rarely sit down for a meal but snack or graze throughout Less Than Weekly   Eat extra snacks between meals Once a Week   Eat most of my food at the end of the day A Few Times a Week   Eat in the middle of the night or wake up at night to eat Less Than Weekly   Eat extra snacks to prevent or correct low blood sugar Never   Eat to prevent acid reflux or stomach pain Never   Worry about not having enough food to eat Never   I eat when I am depressed A Few Times a Week   I eat when I am stressed A Few Times a Week   I eat when I am bored A Few Times a Week   I eat when I am anxious A Few Times a Week   I eat when I am happy or as a reward A Few Times a Week   I feel hungry all the time even if I just have eaten Less Than Weekly   Feeling full is important to me Once a Week   I finish all the food on my plate even if I am already full Almost Everyday   I can't resist eating delicious food or walk past the good food/smell A Few Times a Week   I eat/snack without noticing that I am eating A Few Times a Week   I eat when I am preparing the meal Less Than Weekly   I eat more than usual when I see others eating A Few Times a Week   I have trouble not eating sweets, ice cream, cookies, or chips if they are around the house Never   I think about food all day Less Than Weekly   What foods, if any, do you crave? None           2/22/2024     6:37 PM   Amount of Food   I feel out of control when eating Weekly   I eat a large amount of food, like a loaf of bread, a box of cookies, a pint/quart of ice cream, all at once Never   I eat a large amount of food even when I am not hungry Monthly   I eat rapidly Never   I eat alone because I feel embarrassed and do not want others to see how much I have eaten Never   I eat until I am uncomfortably full  Never   I feel bad, disgusted, or guilty after I overeat Never           2/22/2024     6:37 PM   Activity/Exercise History   How much of a typical 12 hour day do you spend sitting? Most of the Day   How much of a typical 12 hour day do you spend lying down? Less Than Half the Day   How much of a typical day do you spend walking/standing? Less Than Half the Day   How many hours (not including work) do you spend on the TV/Video Games/Computer/Tablet/Phone? 2-3 Hours   How many times a week are you active for the purpose of exercise? 2-3 Times a Week   What keeps you from being more active? Too tired   How many total minutes do you spend doing some activity for the purpose of exercising when you exercise? Less Than 15 Minutes       PAST MEDICAL HISTORY:  Past Medical History:   Diagnosis Date    Forearm fracture 01/01/2004    LT    Scoliosis     Seasonal allergies            2/22/2024     6:37 PM   Work/Social History Reviewed With Patient   My employment status is Full-Time   My job is Coordinator   How much of your job is spent on the computer or phone? 100%   How many hours do you spend commuting to work daily? Less thsn 1hours   What is your marital status? Single   Who do you live with? Mother, Father and Siblings   Who does the food shopping? Mother           2/22/2024     6:37 PM   Mental Health History Reviewed With Patient   Have you ever been physically or sexually abused? No   How often in the past 2 weeks have you felt little interest or pleasure in doing things? For Several Days   Over the past 2 weeks how often have you felt down, depressed, or hopeless? Not at all           2/22/2024     6:37 PM   Sleep History Reviewed With Patient   How many hours do you sleep at night? 8       MEDICATIONS:   Current Outpatient Medications   Medication Sig Dispense Refill    ibuprofen (ADVIL/MOTRIN) 600 MG tablet Take 1 tablet (600 mg) by mouth every 6 hours as needed for moderate pain (4-6) 100 tablet 3    spacer  (OPTICHAMBER ALMA) holding chamber Use with albuterol prn (Patient not taking: Reported on 2/27/2024) 1 each 0       ALLERGIES:   No Known Allergies    PHYSICAL EXAM:  Ht 1.524 m (5')   Wt 81.5 kg (179 lb 10.8 oz)   BMI 35.09 kg/m      Waist circumference:      Wt Readings from Last 4 Encounters:   02/27/24 81.5 kg (179 lb 10.8 oz)   11/14/23 81.5 kg (179 lb 9.6 oz)   11/07/23 79.5 kg (175 lb 4.8 oz)   06/28/22 79.8 kg (175 lb 14.4 oz)     A & O x 3  HEENT: NCAT, mucous membranes moist  Respirations unlabored  Location of obesity: Mixed Obesity    ASSESSMENT/PLAN:  Kiko is a patient with mature onset obesity with significant element of familial/genetic influence and without current health consequences. She does not need aggressive weight loss plan.  Kiko Cabrales eats a high carb diet, eats a high fat diet, uses food as mood management, has perception of intense hunger, mostly eats during the evening, and has a disorganized meal pattern.    Her problem is complicated by strong craving/reward pathways, a binge eating component, and poor lifestyle choices    Her ability to lose weight is impacted by current work life, lack of confidence, and lack of education on nutrition and dietary needs.    PLAN:    Decrease portion sizes  Decrease eating out  Purge house of food triggers  Dietician visit of education  Volumetrics eating plan  Calorie/low fat diet  Meal planning  Increase activity    Craving/Reward   Ancillary testing:  N/A.  Food Plan:  High protein/low carbohydrate.   Activity Plan:  Exercise after meals.  Supplementary:  N/A.   Medication:  The patient will begin medication in pursuit of improved medical status as influenced by body weight. She will start phentermine 15 mg daily in the mid morning.  There is a mutual understanding of the goals and risks of this therapy. The patient is in agreement. She is educated on dosage regimen and possible side effects.    Orders Placed This Encounter    Procedures    Med Therapy Management Referral     Work with nutritionist on diet planning      FOLLOW-UP:   See MTM PharmD in 2 month(s)  See MD or PA in 4 month(s)    Joined the call at 2/27/2024, 10:28:28 am.  Left the call at 2/27/2024, 10:55:56 am.  You were on the call for 27 minutes 28 seconds .    External notes/medical records independently reviewed, labs and imaging independently reviewed, medical management and tests to be discussed/communicated to patient.    Time: I spent 45 minutes spent on the date of the encounter preparing to see patient (including chart review and preparation), obtaining and or reviewing additional medical history, performing a physical exam and evaluation, documenting clinical information in the electronic health record, independently interpreting results, communicating results to the patient and coordinating care.      Sincerely,    Ignacia Pelaez MD

## 2024-02-27 NOTE — NURSING NOTE
Is the patient currently in the state of [place]? YES    Visit mode:VIDEO    If the visit is dropped, the patient can be reconnected by: VIDEO VISIT: Text to cell phone:   Telephone Information:   Mobile [phone]       Will anyone else be joining the visit? NO  (If patient encounters technical issues they should call [phone])    How would you like to obtain your AVS? MyChart    Are changes needed to the allergy or medication list? Yes pt does not use the holding chamber- she does not use an inhaler any longer- only for when she had the flu     Reason for visit: Consult (Ishmael GOMEZ)    Ana Maria Price VVF    Pt sometimes takes fiber vitamins.

## 2024-02-27 NOTE — PATIENT INSTRUCTIONS
Start phentermine 15 mg daily in the mid morning  Work with nutritionist on diet planning    See MTM PharmD in 2 month(s)  See MD or PA in 4 month(s)    If you have any questions, please do not hesitate to call Weight management clinic at [phone] or [phone].  If you need to fax, please fax to [phone].    Sincerely,    Ignacia Pelaez MD  Endocrinology

## 2024-02-29 ENCOUNTER — TELEPHONE (OUTPATIENT)
Dept: ENDOCRINOLOGY | Facility: CLINIC | Age: 24
End: 2024-02-29

## 2024-02-29 VITALS — WEIGHT: 179.68 LBS | BODY MASS INDEX: 30.67 KG/M2 | HEIGHT: 64 IN

## 2024-02-29 NOTE — TELEPHONE ENCOUNTER
MTM referral from: [facility] visit (referral by provider)    MTM referral outreach attempt #2 on February 29, 2024 at 2:03 PM      Outcome: Patient not reachable after several attempts, will route to MTM Pharmacist/Provider as an FYI.  MT scheduling number is .  Thank you for the referral.    Use hbc for the carrier/Plan on the flowsheet      [email or web address] Message Sent    Romelia Howard CPhT  MT

## 2024-02-29 NOTE — NURSING NOTE
"Chief Complaint   Patient presents with    Consult     New Health system       Vitals:    02/27/24 1014   Weight: 81.5 kg (179 lb 10.8 oz)   Height: 1.626 m (5' 4\")       Body mass index is 30.84 kg/m .                              "

## 2024-03-08 ENCOUNTER — TELEPHONE (OUTPATIENT)
Dept: ENDOCRINOLOGY | Facility: CLINIC | Age: 24
End: 2024-03-08
Payer: COMMERCIAL

## 2024-03-08 DIAGNOSIS — E66.09 CLASS 1 OBESITY DUE TO EXCESS CALORIES WITHOUT SERIOUS COMORBIDITY WITH BODY MASS INDEX (BMI) OF 31.0 TO 31.9 IN ADULT: ICD-10-CM

## 2024-03-08 DIAGNOSIS — E66.811 CLASS 1 OBESITY DUE TO EXCESS CALORIES WITHOUT SERIOUS COMORBIDITY WITH BODY MASS INDEX (BMI) OF 31.0 TO 31.9 IN ADULT: ICD-10-CM

## 2024-03-08 RX ORDER — PHENTERMINE HYDROCHLORIDE 15 MG/1
15 CAPSULE ORAL EVERY MORNING
Qty: 30 CAPSULE | Refills: 2 | Status: SHIPPED | OUTPATIENT
Start: 2024-03-08

## 2024-03-08 NOTE — TELEPHONE ENCOUNTER
General Call    Contacts         Type Contact Phone/Fax    03/08/2024 09:24 AM CST Phone (Incoming) Kiko Cabrales (Self) [phone] (H)          Reason for Call:  Phentermine    What are your questions or concerns:  pt needs her medication sent to [facility] in [place] her current pharmacy is out    Could we send this information to you in [facility] or would you prefer to receive a phone call?:   Patient would prefer a phone call   Okay to leave a detailed message?: Yes at Cell number on file:    Telephone Information:   Mobile [phone]

## 2024-03-11 NOTE — PROGRESS NOTES
"Video-Visit Details    Type of service:  Video Visit    Video Start Time: 2:23 PM   Video End Time: 2:57 PM     Originating Location (pt. Location): Home    Distant Location (provider location): Offsite (providers home) [facility]NA[facility] [place]     Platform used for Video Visit: Tuxebo      New Weight Management Nutrition Consultation    Kiko Cabrales is a 23 year old female presents today for new weight management nutrition consultation.  Patient referred by Dr. Beth on 2024.    Patient with Co-morbidities of obesity includin/22/2024     6:37 PM   --   I have the following health issues associated with obesity None of the above   I have the following symptoms associated with obesity Back Pain    Fatigue    Irregular Menstral Cycle     Anthropometrics:  Initial consult weight: 179 lb on 24   Estimated body mass index is 30.83 kg/m  as calculated from the following:    Height as of this encounter: 1.626 m (5' 4.02\").    Weight as of 24: 81.5 kg (179 lb 10.8 oz).  Current Weight: 179 lb per pt report      Medications for Weight Loss:  Phentermine - has not started yet, needs prescription sent to a different pharmacy.     Occupation: Full-time coordinator     NUTRITION HISTORY  Food allergies: NKFA  Food intolerances: some with dairy  - GI issues.   Vitamin/Mineral Supplements: Tries to take fiber vitamins every so often, probiotics   Previous methods of diet modification for weight loss: Watching portions/calories  40 lbs wt gain through the pandemic  RD before: None     Works M-F 8-5 PM and sits in a chair all day.      Wants to be more energized. Tired throughout the day. Feels she gets plenty of sleep. Fasting for Ramadan for 2 months. For her fast she doesn't eat meat or animal products but isn't restricting the time she is eating. Doing more vegan like foods.     Likes beef or chicken. Likes fruits and vegetables.     Diet recall:   Breakfast - " skips however will have water   Lunch - 11:30 or noon, traditional Swazi food (bread (gluten free), mixed with veggies), may have fast food like Chipotle, Mexican//Thia foods.   Dinner - Traditional [place]azi meals.   Not a typically snacker.   Hydration: water throughout the day, coffee drink from [employer] (jenna latte) or [employer]         2/22/2024     6:37 PM   Diet Recall Review with Patient   How many glasses of juice do you drink in a typical day? 2   How many of glasses of milk do you drink in a typical day? 0   How many 8oz glasses of sugar containing drinks such as Kalen-Aid/sweet tea do you drink in a day? 2   How many cans/bottles of sugar pop/soda/tea/sports drinks do you drink in a day? 1   How many cans/bottles of diet pop/soda/tea or sports drink do you drink in a day? 0   How often do you have a drink of alcohol? Monthly or Less   If you do drink, how many drinks might you have in a day? 1 or 2           2/22/2024     6:37 PM   Eating Habits   Generally, my meals include foods like these bread, pasta, rice, potatoes, corn, crackers, sweet dessert, pop, or juice A Few Times a Week   Generally, my meals include foods like these fried meats, brats, burgers, french fries, pizza, cheese, chips, or ice cream A Few Times a Week   Eat fast food (like McDonalds, Burger Gold, Taco Bell) Less Than Weekly   Eat at a buffet or sit-down restaurant Less Than Weekly   Eat most of my meals in front of the TV or computer A Few Times a Week   Often skip meals, eat at random times, have no regular eating times A Few Times a Week   Rarely sit down for a meal but snack or graze throughout Less Than Weekly   Eat extra snacks between meals Once a Week   Eat most of my food at the end of the day A Few Times a Week   Eat in the middle of the night or wake up at night to eat Less Than Weekly   Eat extra snacks to prevent or correct low blood sugar Never   Eat to prevent acid reflux or stomach pain Never   Worry about  not having enough food to eat Never   I eat when I am depressed A Few Times a Week   I eat when I am stressed A Few Times a Week   I eat when I am bored A Few Times a Week   I eat when I am anxious A Few Times a Week   I eat when I am happy or as a reward A Few Times a Week   I feel hungry all the time even if I just have eaten Less Than Weekly   Feeling full is important to me Once a Week   I finish all the food on my plate even if I am already full Almost Everyday   I can't resist eating delicious food or walk past the good food/smell A Few Times a Week   I eat/snack without noticing that I am eating A Few Times a Week   I eat when I am preparing the meal Less Than Weekly   I eat more than usual when I see others eating A Few Times a Week   I have trouble not eating sweets, ice cream, cookies, or chips if they are around the house Never   I think about food all day Less Than Weekly   What foods, if any, do you crave? None           2/22/2024     6:37 PM   Amount of Food   I feel out of control when eating Weekly   I eat a large amount of food, like a loaf of bread, a box of cookies, a pint/quart of ice cream, all at once Never   I eat a large amount of food even when I am not hungry Monthly   I eat rapidly Never   I eat alone because I feel embarrassed and do not want others to see how much I have eaten Never   I eat until I am uncomfortably full Never   I feel bad, disgusted, or guilty after I overeat Never     Physical Activity:  Trying to get into doing more physical activity, has a gym membership         2/22/2024     6:37 PM   Activity/Exercise History   How much of a typical 12 hour day do you spend sitting? Most of the Day   How much of a typical 12 hour day do you spend lying down? Less Than Half the Day   How much of a typical day do you spend walking/standing? Less Than Half the Day   How many hours (not including work) do you spend on the TV/Video Games/Computer/Tablet/Phone? 2-3 Hours   How many times  a week are you active for the purpose of exercise? 2-3 Times a Week   What keeps you from being more active? Too tired   How many total minutes do you spend doing some activity for the purpose of exercising when you exercise? Less Than 15 Minutes     Nutrition Prescription  Recommended energy/nutrient modification.    Nutrition Diagnosis  Obesity r/t long history of positive energy balance aeb BMI >30.    Nutrition Intervention  Materials/education provided on hypocaloric diet for weight loss. Discussed importance of regular eating to fuel her body and incorporating foods high in protein. Encouraged to aim for at least 60 grams of protein daily.  Volumetric eating to help satiety level on fewer calories; portion control and healthy food choices (Plate Method and Volumetrics handouts). Patient demonstrates understanding. Co-developed goals to work towards. Provided pt with list of goals and resources below via [employer].     Expected Engagement: good    Nutrition Goals  1) Try to eat a meal in the morning   2) Be more mindful of protein at meals, try to aim for at least 60 grams daily.   3) Go to the gym at least 1x per week before work.     The Plate Method:  Plate method can be used for general guidance on balanced meals/portion sizes (see link below for picture/more information)                 Make 1/2 your plate non starchy vegetables (cauliflower, broccoli, asparagus, [place] sprouts, lettuce, carrots, for example).                3+ oz lean protein sources (salmon/skinless chicken/turkey breast/pork loin/lean cuts of beef/ or non-animal protein such as black, kidney or hussein beans, tofu/edamame/tempeh)    (Note: 3 oz = deck of cards size)                1/2 to 1 cup carbohydrate choices such as whole grain starches or starchy vegetables or fruit. For example: quinoa, brown rice, barley, potatoes, sweet potatoes, winter squash, peas, corn, or fruit.                Choose ~0-2 added fat servings at a meal  "(avocados, nut butters, nuts or seeds, olive oil, vegetable oils).    [email or web address]    Protein Sources   [email or web address]     Quick/Easy Protein Sources:  Hard boiled eggs  Part-skim cheese sticks  Baby Bell cheese rounds  Low-fat/low-sugar Greek yogurt  Low-fat cottage cheese  Lean deli meat (chicken/turkey/ham)  Roasted chickpeas or lentils  Nuts   Turkey meat stick  Protein shake/bar  \"P3\" snack (cheese, nuts, deli meat)  [employer]i's \"Protein Bread\"   \"Egglife\" egg white wrap    Tuna/salmon can/packet     Carbohydrates  [email or web address]     Mindful Eating  [email or web address]     Summary of Volumetrics Eating Plan  [email or web address]     Follow-Up: April 19.     Time spent with patient: 34 minutes.    Nan Kaur RD, LD        "

## 2024-03-12 ENCOUNTER — VIRTUAL VISIT (OUTPATIENT)
Dept: ENDOCRINOLOGY | Facility: CLINIC | Age: 24
End: 2024-03-12
Payer: COMMERCIAL

## 2024-03-12 VITALS — HEIGHT: 64 IN | BODY MASS INDEX: 30.83 KG/M2

## 2024-03-12 DIAGNOSIS — E66.09 CLASS 1 OBESITY DUE TO EXCESS CALORIES WITHOUT SERIOUS COMORBIDITY WITH BODY MASS INDEX (BMI) OF 31.0 TO 31.9 IN ADULT: ICD-10-CM

## 2024-03-12 DIAGNOSIS — Z71.3 NUTRITIONAL COUNSELING: Primary | ICD-10-CM

## 2024-03-12 DIAGNOSIS — E66.811 CLASS 1 OBESITY DUE TO EXCESS CALORIES WITHOUT SERIOUS COMORBIDITY WITH BODY MASS INDEX (BMI) OF 31.0 TO 31.9 IN ADULT: ICD-10-CM

## 2024-03-12 PROCEDURE — 97802 MEDICAL NUTRITION INDIV IN: CPT | Mod: 95

## 2024-03-12 PROCEDURE — 99207 PR NO CHARGE LOS: CPT | Mod: 95

## 2024-03-12 ASSESSMENT — PAIN SCALES - GENERAL: PAINLEVEL: MILD PAIN (3)

## 2024-03-12 NOTE — PATIENT INSTRUCTIONS
"Rico Hoover,     It was nice to meet you today.     Follow-up with RD on April 19.     Thank you,    Nan Kaur, JANET, LD  If you would like to schedule or reschedule an appointment with the RD, please call [phone]    Nutrition Goals  1) Try to eat a meal in the morning   2) Be more mindful of protein at meals, try to aim for at least 60 grams daily.   3) Go to the gym at least 1x per week before work.     The Plate Method:  Plate method can be used for general guidance on balanced meals/portion sizes (see link below for picture/more information)                 Make 1/2 your plate non starchy vegetables (cauliflower, broccoli, asparagus, [place] sprouts, lettuce, carrots, for example).                3+ oz lean protein sources (salmon/skinless chicken/turkey breast/pork loin/lean cuts of beef/ or non-animal protein such as black, kidney or hussein beans, tofu/edamame/tempeh)    (Note: 3 oz = deck of cards size)                1/2 to 1 cup carbohydrate choices such as whole grain starches or starchy vegetables or fruit. For example: quinoa, brown rice, barley, potatoes, sweet potatoes, winter squash, peas, corn, or fruit.                Choose ~0-2 added fat servings at a meal (avocados, nut butters, nuts or seeds, olive oil, vegetable oils).    [email or web address]    Protein Sources   http://"[email or web address][email or web address]     Quick/Easy Protein Sources:  Hard boiled eggs  Part-skim cheese sticks  Baby Bell cheese rounds  Low-fat/low-sugar Greek yogurt  Low-fat cottage cheese  Lean deli meat (chicken/turkey/ham)  Roasted chickpeas or lentils  Nuts   Turkey meat stick  Protein shake/bar  \"P3\" snack (cheese, nuts, deli meat)  Aldi's \"Protein Bread\"   \"Egglife\" egg white wrap    Tuna/salmon can/packet     Carbohydrates  [email or web address]     Mindful Eating  http://"[email or web address][email or web address]     Summary of Volumetrics Eating Plan  [email or web address]     COMPREHENSIVE WEIGHT MANAGEMENT " PROGRAM  VIRTUAL SUPPORT GROUPS    At [facility], our Comprehensive Weight Management program offers on-line support groups for patients who are working on weight loss and considering, preparing for, or have had weight loss surgery.     There is no cost for this opportunity.  You are invited to attend the?Virtual Support Groups?provided by any of the following locations:    [facility] via Microsoft Teams with Xenia Garcia RN  2.   [place] via [facility] with Gonzales Servin, PhD, LP  3.   [place] via [facility] with Jailene Cameron RN  4.   [facility] via a Zoom Meeting with MARISOL Richards    The following Support Group information can also be found on our website:  [email or web address]      [facility] Weight Loss Surgery Support Group  The support group is a patient-lead forum that meets monthly to share experiences, encouragement and education. It is open to those who have had weight loss surgery, are scheduled for surgery, or are considering surgery.   WHEN: This group meets on the 3rd Wednesday of each month from 5:00PM - 6:00PM virtually using Microsoft Teams.   FACILITATOR: Led by Xenia Bradley RD, DIEGO, RN, the program's Clinical Coordinator.   TO REGISTER: Please contact the clinic via [employer] or call the nurse line directly at [phone] to inform our staff that you would like an invite sent to you and to let us know the email you would like the invite sent to. Prior to the meeting, a link with directions on how to join the meeting will be sent to you.    2024 Meetings   January 17  February 21  March 20  April 17  May 15  Alis 19      [facility] Bariatric Care Support Group?  This is open to all pre- and post- operative bariatric surgery patients as well as their support system.   WHEN: This group meets the 3rd Tuesday  "of each month from 6:30 PM - 8:00 PM virtually using Microsoft Teams.   FACILITATOR: Led by Gonzales Servin, Ph.D who is a Licensed Psychologist with the [facility] Comprehensive Weight Management Program.   TO REGISTER: Please send an email to Gonzales Servin, Ph.D.,  at?[email or web address]?if you would like an invitation to the group. Prior to the meeting, a link with directions on how to join the meeting will be sent to you.    2024 Meetings January 16: \"Medication Management and Bariatric Surgery\", Prema Lund, PharmD, Pharmacy Resident at [facility], [facility]  February 20: \"A Bariatric Surgery Patient's Perspective\", BRYAN Coley, [facility], Behavioral Health Clinician at [facility]  March 19  April 16  May 21  Alis 18: \"Nutritional Labeling\", Dietitian speaker to be announced.  November 19: \"Holiday Eating\", Dietitian speaker to be announced.    [facility] Post-Operative Bariatric Surgery Support Group  This is a support group for [facility] bariatric patients (and those external to [facility]) who have had bariatric surgery and are at least 3 months post-surgery.  WHEN: This support group meets the 4th Wednesday of the month from 11:00 AM - 12:00 PM virtually using Microsoft Teams.   FACILITATOR: Led by Certified Bariatric Nurse, Jailene Cameron RN.   TO REGISTER: Please send an email to Jailene at [email or web address] if you would like an invitation to the group.  Prior to the meeting, a link with directions on how to join the meeting will be sent to you.    2024 Meetings  January 24  February 28  March 27  April 24  May 22  Alis 26    [facility] Healthy Lifestyle Group?  This is a 60 minute virtual coaching group for those who want to lead a healthier lifestyle. Come together to set goals and overcome barriers in a supportive group environment. " "We will address the four pillars of health: nutrition, exercise, sleep and emotional well-being.  This group is highly recommended for those who are participating in the 24 week Healthy Lifestyle Plan and our Health Coaching sessions.  WHEN: This group meets the 1st Friday of the month, 12:30 PM - 1:30 PM online, via a zoom meeting.    FACILITATOR: Led by National Board Certified Health and , Jailene Moreira [facility].   TO REGISTER: Please call the Call Center at [phone] to register. You will get an appointment to attend in [facility]. Fifteen minutes prior to the meeting, complete the e-check in and you will get the link to join the meeting.    There is no charge to attend this group and space is limited.     2024 Meetings  January 5: \"New Years Vision: Manifest your Best 2024!\" (guided imagery,  journaling and discussion)  February 2: \"Let's Talk\"  March 1: \"10 Percent Happier\" by Patrick Haynes (Book Bites - a guided discussion on the nuggets of wisdom from favorite wellness books, no need to read the book but highly encouraged)  April 5: \"Let's Talk\"  May 3: \"Essentialism: The Disciplined Pursuit of Less\" by Farrukh Adame (Book Bites discussion)  June 7: \"Let's Talk\"  July 5: NO MEETING, off for the 4th of July Holiday  August 2: \"The Blue Zones, Secrets for Living a Longer Life\" by Patrick Durbin (Book Bites discussion)        "

## 2024-03-12 NOTE — NURSING NOTE
Is the patient currently in the state of [place]? YES    Visit mode:VIDEO    If the visit is dropped, the patient can be reconnected by: VIDEO VISIT: Text to cell phone:   Telephone Information:   Mobile [phone]       Will anyone else be joining the visit? NO  (If patient encounters technical issues they should call [phone]    How would you like to obtain your AVS? MyChart    Are changes needed to the allergy or medication list? N/A    Reason for visit: Consult    Radha MASTERSON

## 2024-03-12 NOTE — LETTER
"3/12/2024       RE: Kiko Cabrales  [address] [place] [id number]     Dear Colleague,    Thank you for referring your patient, Kiko Cabrales, to the [facility] [place] at [facility]. Please see a copy of my visit note below.    Video-Visit Details    Type of service:  Video Visit    Video Start Time: 2:23 PM   Video End Time: 2:57 PM     Originating Location (pt. Location): Home    Distant Location (provider location): Offsite (providers home) [facility] [place]     Platform used for Video Visit: HAUL      New Weight Management Nutrition Consultation    Kiko Cabrales is a 23 year old female presents today for new weight management nutrition consultation.  Patient referred by Dr. Beth on 2024.    Patient with Co-morbidities of obesity includin/22/2024     6:37 PM   --   I have the following health issues associated with obesity None of the above   I have the following symptoms associated with obesity Back Pain    Fatigue    Irregular Menstral Cycle     Anthropometrics:  Initial consult weight: 179 lb on 24   Estimated body mass index is 30.83 kg/m  as calculated from the following:    Height as of this encounter: 1.626 m (5' 4.02\").    Weight as of 24: 81.5 kg (179 lb 10.8 oz).  Current Weight: 179 lb per pt report      Medications for Weight Loss:  Phentermine - has not started yet, needs prescription sent to a different pharmacy.     Occupation: Full-time coordinator     NUTRITION HISTORY  Food allergies: NKFA  Food intolerances: some with dairy  - GI issues.   Vitamin/Mineral Supplements: Tries to take fiber vitamins every so often, probiotics   Previous methods of diet modification for weight loss: Watching portions/calories  40 lbs wt gain through the pandemic  RD before: None     Works M-F 8-5 PM and sits in a chair all day.  "     Wants to be more energized. Tired throughout the day. Feels she gets plenty of sleep. Fasting for Ramadan for 2 months. For her fast she doesn't eat meat or animal products but isn't restricting the time she is eating. Doing more vegan like foods.     Likes beef or chicken. Likes fruits and vegetables.     Diet recall:   Breakfast - skips however will have water   Lunch - 11:30 or noon, traditional [place] food (bread (gluten free), mixed with veggies), may have fast food like Chipotle, Mexican//Thia foods.   Dinner - Traditional [place] meals.   Not a typically snacker.   Hydration: water throughout the day, coffee drink from [employer] or [employer]         2/22/2024     6:37 PM   Diet Recall Review with Patient   How many glasses of juice do you drink in a typical day? 2   How many of glasses of milk do you drink in a typical day? 0   How many 8oz glasses of sugar containing drinks such as Kalen-Aid/sweet tea do you drink in a day? 2   How many cans/bottles of sugar pop/soda/tea/sports drinks do you drink in a day? 1   How many cans/bottles of diet pop/soda/tea or sports drink do you drink in a day? 0   How often do you have a drink of alcohol? Monthly or Less   If you do drink, how many drinks might you have in a day? 1 or 2           2/22/2024     6:37 PM   Eating Habits   Generally, my meals include foods like these bread, pasta, rice, potatoes, corn, crackers, sweet dessert, pop, or juice A Few Times a Week   Generally, my meals include foods like these fried meats, brats, burgers, french fries, pizza, cheese, chips, or ice cream A Few Times a Week   Eat fast food (like McDonalds, Burger Gold, Taco Bell) Less Than Weekly   Eat at a buffet or sit-down restaurant Less Than Weekly   Eat most of my meals in front of the TV or computer A Few Times a Week   Often skip meals, eat at random times, have no regular eating times A Few Times a Week   Rarely sit down for a meal but snack or graze  throughout Less Than Weekly   Eat extra snacks between meals Once a Week   Eat most of my food at the end of the day A Few Times a Week   Eat in the middle of the night or wake up at night to eat Less Than Weekly   Eat extra snacks to prevent or correct low blood sugar Never   Eat to prevent acid reflux or stomach pain Never   Worry about not having enough food to eat Never   I eat when I am depressed A Few Times a Week   I eat when I am stressed A Few Times a Week   I eat when I am bored A Few Times a Week   I eat when I am anxious A Few Times a Week   I eat when I am happy or as a reward A Few Times a Week   I feel hungry all the time even if I just have eaten Less Than Weekly   Feeling full is important to me Once a Week   I finish all the food on my plate even if I am already full Almost Everyday   I can't resist eating delicious food or walk past the good food/smell A Few Times a Week   I eat/snack without noticing that I am eating A Few Times a Week   I eat when I am preparing the meal Less Than Weekly   I eat more than usual when I see others eating A Few Times a Week   I have trouble not eating sweets, ice cream, cookies, or chips if they are around the house Never   I think about food all day Less Than Weekly   What foods, if any, do you crave? None           2/22/2024     6:37 PM   Amount of Food   I feel out of control when eating Weekly   I eat a large amount of food, like a loaf of bread, a box of cookies, a pint/quart of ice cream, all at once Never   I eat a large amount of food even when I am not hungry Monthly   I eat rapidly Never   I eat alone because I feel embarrassed and do not want others to see how much I have eaten Never   I eat until I am uncomfortably full Never   I feel bad, disgusted, or guilty after I overeat Never     Physical Activity:  Trying to get into doing more physical activity, has a gym membership         2/22/2024     6:37 PM   Activity/Exercise History   How much of a  typical 12 hour day do you spend sitting? Most of the Day   How much of a typical 12 hour day do you spend lying down? Less Than Half the Day   How much of a typical day do you spend walking/standing? Less Than Half the Day   How many hours (not including work) do you spend on the TV/Video Games/Computer/Tablet/Phone? 2-3 Hours   How many times a week are you active for the purpose of exercise? 2-3 Times a Week   What keeps you from being more active? Too tired   How many total minutes do you spend doing some activity for the purpose of exercising when you exercise? Less Than 15 Minutes     Nutrition Prescription  Recommended energy/nutrient modification.    Nutrition Diagnosis  Obesity r/t long history of positive energy balance aeb BMI >30.    Nutrition Intervention  Materials/education provided on hypocaloric diet for weight loss. Discussed importance of regular eating to fuel her body and incorporating foods high in protein. Encouraged to aim for at least 60 grams of protein daily.  Volumetric eating to help satiety level on fewer calories; portion control and healthy food choices (Plate Method and Volumetrics handouts). Patient demonstrates understanding. Co-developed goals to work towards. Provided pt with list of goals and resources below via [employer].     Expected Engagement: good    Nutrition Goals  1) Try to eat a meal in the morning   2) Be more mindful of protein at meals, try to aim for at least 60 grams daily.   3) Go to the gym at least 1x per week before work.     The Plate Method:  Plate method can be used for general guidance on balanced meals/portion sizes (see link below for picture/more information)                 Make 1/2 your plate non starchy vegetables (cauliflower, broccoli, asparagus, [place] sprouts, lettuce, carrots, for example).                3+ oz lean protein sources (salmon/skinless chicken/turkey breast/pork loin/lean cuts of beef/ or non-animal protein such as black, kidney or  "hussein beans, tofu/edamame/tempeh)    (Note: 3 oz = deck of cards size)                1/2 to 1 cup carbohydrate choices such as whole grain starches or starchy vegetables or fruit. For example: quinoa, brown rice, barley, potatoes, sweet potatoes, winter squash, peas, corn, or fruit.                Choose ~0-2 added fat servings at a meal (avocados, nut butters, nuts or seeds, olive oil, vegetable oils).    [email or web address]    Protein Sources   [email or web address]     Quick/Easy Protein Sources:  Hard boiled eggs  Part-skim cheese sticks  Baby Bell cheese rounds  Low-fat/low-sugar Greek yogurt  Low-fat cottage cheese  Lean deli meat (chicken/turkey/ham)  Roasted chickpeas or lentils  Nuts   Turkey meat stick  Protein shake/bar  \"P3\" snack (cheese, nuts, deli meat)  [employer]i's \"Protein Bread\"   \"Egglife\" egg white wrap    Tuna/salmon can/packet     Carbohydrates  [email or web address]     Mindful Eating  [email or web address]     Summary of Volumetrics Eating Plan  [email or web address]     Follow-Up: April 19.     Time spent with patient: 34 minutes.    Nan Kaur RD, LD    "

## 2024-03-18 ENCOUNTER — TELEPHONE (OUTPATIENT)
Dept: ENDOCRINOLOGY | Facility: CLINIC | Age: 24
End: 2024-03-18
Payer: COMMERCIAL

## 2024-10-15 ENCOUNTER — PATIENT OUTREACH (OUTPATIENT)
Dept: CARE COORDINATION | Facility: CLINIC | Age: 24
End: 2024-10-15
Payer: COMMERCIAL

## 2024-10-29 ENCOUNTER — PATIENT OUTREACH (OUTPATIENT)
Dept: CARE COORDINATION | Facility: CLINIC | Age: 24
End: 2024-10-29
Payer: COMMERCIAL

## 2024-12-29 ENCOUNTER — HEALTH MAINTENANCE LETTER (OUTPATIENT)
Age: 24
End: 2024-12-29

## 2025-05-28 ENCOUNTER — MYC REFILL (OUTPATIENT)
Dept: ENDOCRINOLOGY | Facility: CLINIC | Age: 25
End: 2025-05-28
Payer: COMMERCIAL

## 2025-05-28 ENCOUNTER — MYC REFILL (OUTPATIENT)
Dept: FAMILY MEDICINE | Facility: CLINIC | Age: 25
End: 2025-05-28
Payer: COMMERCIAL

## 2025-05-28 DIAGNOSIS — E66.09 CLASS 1 OBESITY DUE TO EXCESS CALORIES WITHOUT SERIOUS COMORBIDITY WITH BODY MASS INDEX (BMI) OF 31.0 TO 31.9 IN ADULT: ICD-10-CM

## 2025-05-28 DIAGNOSIS — E66.811 CLASS 1 OBESITY DUE TO EXCESS CALORIES WITHOUT SERIOUS COMORBIDITY WITH BODY MASS INDEX (BMI) OF 31.0 TO 31.9 IN ADULT: ICD-10-CM

## 2025-05-28 DIAGNOSIS — N94.6 DYSMENORRHEA: ICD-10-CM

## 2025-05-28 RX ORDER — PHENTERMINE HYDROCHLORIDE 15 MG/1
15 CAPSULE ORAL EVERY MORNING
Qty: 30 CAPSULE | Refills: 2 | Status: CANCELLED | OUTPATIENT
Start: 2025-05-28

## 2025-05-29 RX ORDER — IBUPROFEN 600 MG/1
600 TABLET, FILM COATED ORAL EVERY 6 HOURS PRN
Qty: 100 TABLET | Refills: 3 | Status: SHIPPED | OUTPATIENT
Start: 2025-05-29

## 2025-07-03 DIAGNOSIS — E66.09 CLASS 1 OBESITY DUE TO EXCESS CALORIES WITHOUT SERIOUS COMORBIDITY WITH BODY MASS INDEX (BMI) OF 31.0 TO 31.9 IN ADULT: ICD-10-CM

## 2025-07-03 DIAGNOSIS — E66.811 CLASS 1 OBESITY DUE TO EXCESS CALORIES WITHOUT SERIOUS COMORBIDITY WITH BODY MASS INDEX (BMI) OF 31.0 TO 31.9 IN ADULT: ICD-10-CM

## 2025-07-03 NOTE — TELEPHONE ENCOUNTER
Medication Question or Refill    Contacts       Contact Date/Time Type Contact Phone/Fax    07/03/2025 03:45 PM CDT Phone (Incoming) Kiko Cabrales (Self) [phone] (H)            What medication are you calling about (include dose and sig)?: phentermine 15 MG capsule     Preferred Pharmacy:       [place] [id number] [place] [address] [address]  Phone: [phone] Fax: [phone]      Controlled Substance Agreement on file:   CSA -- Patient Level:    CSA: None found at the patient level.       Who prescribed the medication?: Tasma    Do you need a refill? Yes    When did you use the medication last? It has been a while, she can't remember    Patient offered an appointment? Yes: 8/19    Do you have any questions or concerns?  No      Could we send this information to you in [facility] or would you prefer to receive a phone call?:   Patient would prefer a phone call   Okay to leave a detailed message?: Yes at Cell number on file:    Telephone Information:   Mobile [phone]

## 2025-07-05 ENCOUNTER — MYC REFILL (OUTPATIENT)
Dept: ENDOCRINOLOGY | Facility: CLINIC | Age: 25
End: 2025-07-05
Payer: COMMERCIAL

## 2025-07-05 DIAGNOSIS — E66.09 CLASS 1 OBESITY DUE TO EXCESS CALORIES WITHOUT SERIOUS COMORBIDITY WITH BODY MASS INDEX (BMI) OF 31.0 TO 31.9 IN ADULT: ICD-10-CM

## 2025-07-05 DIAGNOSIS — E66.811 CLASS 1 OBESITY DUE TO EXCESS CALORIES WITHOUT SERIOUS COMORBIDITY WITH BODY MASS INDEX (BMI) OF 31.0 TO 31.9 IN ADULT: ICD-10-CM

## 2025-07-05 RX ORDER — PHENTERMINE HYDROCHLORIDE 15 MG/1
15 CAPSULE ORAL EVERY MORNING
Qty: 30 CAPSULE | Refills: 2 | Status: CANCELLED | OUTPATIENT
Start: 2025-07-05

## 2025-07-05 NOTE — TELEPHONE ENCOUNTER
phentermine 15 MG capsule   Start: 03/08/2024   Disp  30  R  2  Take 1 capsule (15 mg) by mouth every morning     Ignacia Pelaez MD  Endocrinology, Diabetes, and Metabolism  Lv 2/27/24  Nv   8/19/25    Creatinine   Date Value Ref Range Status   02/04/2022 0.60 0.52 - 1.04 mg/dL Final   Gfr not found      Refill decision:    Medication unable to be refilled by RN due to: request per pt call.  Controlled medication  lv 2/24. Nv 8/19/25 labs past due.      Request from pharmacy:  Requested Prescriptions   Pending Prescriptions Disp Refills    phentermine 15 MG capsule 30 capsule 2     Sig: Take 1 capsule (15 mg) by mouth every morning.       There is no refill protocol information for this order

## 2025-07-06 RX ORDER — PHENTERMINE HYDROCHLORIDE 15 MG/1
15 CAPSULE ORAL EVERY MORNING
Qty: 30 CAPSULE | Refills: 2 | Status: SHIPPED | OUTPATIENT
Start: 2025-07-06

## 2025-07-07 DIAGNOSIS — E66.811 CLASS 1 OBESITY DUE TO EXCESS CALORIES WITHOUT SERIOUS COMORBIDITY WITH BODY MASS INDEX (BMI) OF 31.0 TO 31.9 IN ADULT: ICD-10-CM

## 2025-07-07 DIAGNOSIS — E66.09 CLASS 1 OBESITY DUE TO EXCESS CALORIES WITHOUT SERIOUS COMORBIDITY WITH BODY MASS INDEX (BMI) OF 31.0 TO 31.9 IN ADULT: ICD-10-CM

## 2025-07-07 NOTE — TELEPHONE ENCOUNTER
Medication Question or Refill        What medication are you calling about (include dose and sig)?:   phentermine  phentermine 15 MG capsule    Preferred Pharmacy:       [place] [id number] [place] [address] [address]  Phone: [phone] Fax: [phone]      Controlled Substance Agreement on file:   CSA -- Patient Level:    CSA: None found at the patient level.       Who prescribed the medication?: Dr LUNA    Do you need a refill? Yes    When did you use the medication last? About 2 months    Patient offered an appointment? No    Do you have any questions or concerns?  Yes: pharmacy hasn't received the prescription      Could we send this information to you in [employer] or would you prefer to receive a phone call?:   Patient would prefer a phone call   Okay to leave a detailed message?: Yes at Cell number on file:    Telephone Information:   Mobile [phone]

## 2025-07-08 RX ORDER — PHENTERMINE HYDROCHLORIDE 15 MG/1
15 CAPSULE ORAL EVERY MORNING
Qty: 30 CAPSULE | Refills: 2 | Status: CANCELLED | OUTPATIENT
Start: 2025-07-08

## 2025-07-08 NOTE — TELEPHONE ENCOUNTER
"phentermine 15 MG cap   Start 7/6/25  Disp 30  R  2  Take 1 capsule (15 mg) by mouth every morning.     Ignacia Pelaez MD  Endocrinology, Diabetes, and Metabolism    2/27/24  Nv  8/19/25    Request per pt call. Med on med list  but  \"Prior authorization: Waiting for Payer Response \"  "

## 2025-07-14 ENCOUNTER — TELEPHONE (OUTPATIENT)
Dept: ENDOCRINOLOGY | Facility: CLINIC | Age: 25
End: 2025-07-14
Payer: COMMERCIAL

## 2025-07-15 NOTE — TELEPHONE ENCOUNTER
PRIOR AUTHORIZATION DENIED    Medication: PHENTERMINE HCL 15 MG PO CAPS  Insurance Company: [employer]-Specialty [employer]'s - Phone [phone] Fax [phone]  Denial Date: 7/14/2025  Denial Reason(s): Need documentation that patient has lost greater than or equal to 5% since starting. 02/27/24 SW 179lb, no updated weight on file since then.      Appeal Information: case # [id number]      Patient Notified: No

## 2025-07-15 NOTE — TELEPHONE ENCOUNTER
[employer] message sent to patient asking for her current weight. If lost more than 5%, will appeal.

## 2025-08-16 ENCOUNTER — MYC REFILL (OUTPATIENT)
Dept: ENDOCRINOLOGY | Facility: CLINIC | Age: 25
End: 2025-08-16
Payer: COMMERCIAL

## 2025-08-16 DIAGNOSIS — E66.09 CLASS 1 OBESITY DUE TO EXCESS CALORIES WITHOUT SERIOUS COMORBIDITY WITH BODY MASS INDEX (BMI) OF 31.0 TO 31.9 IN ADULT: ICD-10-CM

## 2025-08-16 DIAGNOSIS — E66.811 CLASS 1 OBESITY DUE TO EXCESS CALORIES WITHOUT SERIOUS COMORBIDITY WITH BODY MASS INDEX (BMI) OF 31.0 TO 31.9 IN ADULT: ICD-10-CM

## 2025-08-16 RX ORDER — PHENTERMINE HYDROCHLORIDE 15 MG/1
15 CAPSULE ORAL EVERY MORNING
Qty: 30 CAPSULE | Refills: 2 | Status: CANCELLED | OUTPATIENT
Start: 2025-08-16

## 2025-08-19 ENCOUNTER — VIRTUAL VISIT (OUTPATIENT)
Dept: ENDOCRINOLOGY | Facility: CLINIC | Age: 25
End: 2025-08-19
Payer: COMMERCIAL

## 2025-08-19 VITALS — BODY MASS INDEX: 30.05 KG/M2 | WEIGHT: 176 LBS | HEIGHT: 64 IN

## 2025-08-19 DIAGNOSIS — E66.09 CLASS 1 OBESITY DUE TO EXCESS CALORIES WITHOUT SERIOUS COMORBIDITY WITH BODY MASS INDEX (BMI) OF 31.0 TO 31.9 IN ADULT: Primary | ICD-10-CM

## 2025-08-19 DIAGNOSIS — E66.811 CLASS 1 OBESITY DUE TO EXCESS CALORIES WITHOUT SERIOUS COMORBIDITY WITH BODY MASS INDEX (BMI) OF 31.0 TO 31.9 IN ADULT: Primary | ICD-10-CM

## 2025-08-19 PROCEDURE — G2211 COMPLEX E/M VISIT ADD ON: HCPCS | Performed by: INTERNAL MEDICINE

## 2025-08-19 PROCEDURE — 1126F AMNT PAIN NOTED NONE PRSNT: CPT | Mod: 95 | Performed by: INTERNAL MEDICINE

## 2025-08-19 PROCEDURE — 98006 SYNCH AUDIO-VIDEO EST MOD 30: CPT | Performed by: INTERNAL MEDICINE

## 2025-08-19 ASSESSMENT — PAIN SCALES - GENERAL: PAINLEVEL_OUTOF10: NO PAIN (0)

## 2025-08-20 ENCOUNTER — TELEPHONE (OUTPATIENT)
Dept: ENDOCRINOLOGY | Facility: CLINIC | Age: 25
End: 2025-08-20
Payer: COMMERCIAL